# Patient Record
Sex: FEMALE | Race: WHITE | Employment: FULL TIME | URBAN - METROPOLITAN AREA
[De-identification: names, ages, dates, MRNs, and addresses within clinical notes are randomized per-mention and may not be internally consistent; named-entity substitution may affect disease eponyms.]

---

## 2018-04-09 ENCOUNTER — HOSPITAL ENCOUNTER (OUTPATIENT)
Dept: CT IMAGING | Age: 58
Discharge: HOME | End: 2018-04-11
Payer: COMMERCIAL

## 2018-04-09 ENCOUNTER — HOSPITAL ENCOUNTER (OUTPATIENT)
Age: 58
Discharge: HOME | End: 2018-04-11
Payer: COMMERCIAL

## 2018-04-09 ENCOUNTER — OFFICE VISIT (OUTPATIENT)
Dept: CARDIOLOGY CLINIC | Age: 58
End: 2018-04-09
Payer: COMMERCIAL

## 2018-04-09 ENCOUNTER — HOSPITAL ENCOUNTER (OUTPATIENT)
Age: 58
Discharge: HOME | End: 2018-04-09
Payer: COMMERCIAL

## 2018-04-09 VITALS
HEART RATE: 103 BPM | BODY MASS INDEX: 30.05 KG/M2 | DIASTOLIC BLOOD PRESSURE: 80 MMHG | RESPIRATION RATE: 16 BRPM | SYSTOLIC BLOOD PRESSURE: 126 MMHG | HEIGHT: 68 IN | WEIGHT: 198.3 LBS

## 2018-04-09 DIAGNOSIS — R06.02 SOB (SHORTNESS OF BREATH): Primary | ICD-10-CM

## 2018-04-09 DIAGNOSIS — R00.0 TACHYCARDIA: ICD-10-CM

## 2018-04-09 DIAGNOSIS — I26.99 OTHER PULMONARY EMBOLISM WITHOUT ACUTE COR PULMONALE, UNSPECIFIED CHRONICITY (HCC): ICD-10-CM

## 2018-04-09 DIAGNOSIS — R06.02 SOB (SHORTNESS OF BREATH): ICD-10-CM

## 2018-04-09 LAB
ANION GAP SERPL CALCULATED.3IONS-SCNC: 12 MMOL/L (ref 7–16)
BUN BLDV-MCNC: 11 MG/DL (ref 6–20)
CALCIUM SERPL-MCNC: 10.5 MG/DL (ref 8.6–10.2)
CHLORIDE BLD-SCNC: 101 MMOL/L (ref 98–107)
CO2: 27 MMOL/L (ref 22–29)
CREAT SERPL-MCNC: 1.1 MG/DL (ref 0.5–1)
D DIMER: 801 NG/ML DDU
GFR AFRICAN AMERICAN: >60
GFR NON-AFRICAN AMERICAN: 51 ML/MIN/1.73
GLUCOSE BLD-MCNC: 90 MG/DL (ref 74–109)
MAGNESIUM: 2.1 MG/DL (ref 1.6–2.6)
POTASSIUM SERPL-SCNC: 4.4 MMOL/L (ref 3.5–5)
SODIUM BLD-SCNC: 140 MMOL/L (ref 132–146)
TSH SERPL DL<=0.05 MIU/L-ACNC: 1.65 UIU/ML (ref 0.27–4.2)

## 2018-04-09 PROCEDURE — G8417 CALC BMI ABV UP PARAM F/U: HCPCS | Performed by: INTERNAL MEDICINE

## 2018-04-09 PROCEDURE — 3014F SCREEN MAMMO DOC REV: CPT | Performed by: INTERNAL MEDICINE

## 2018-04-09 PROCEDURE — 85378 FIBRIN DEGRADE SEMIQUANT: CPT

## 2018-04-09 PROCEDURE — G8427 DOCREV CUR MEDS BY ELIG CLIN: HCPCS | Performed by: INTERNAL MEDICINE

## 2018-04-09 PROCEDURE — 36415 COLL VENOUS BLD VENIPUNCTURE: CPT

## 2018-04-09 PROCEDURE — 80048 BASIC METABOLIC PNL TOTAL CA: CPT

## 2018-04-09 PROCEDURE — 83735 ASSAY OF MAGNESIUM: CPT

## 2018-04-09 PROCEDURE — 71275 CT ANGIOGRAPHY CHEST: CPT

## 2018-04-09 PROCEDURE — 93000 ELECTROCARDIOGRAM COMPLETE: CPT | Performed by: INTERNAL MEDICINE

## 2018-04-09 PROCEDURE — 3017F COLORECTAL CA SCREEN DOC REV: CPT | Performed by: INTERNAL MEDICINE

## 2018-04-09 PROCEDURE — 99244 OFF/OP CNSLTJ NEW/EST MOD 40: CPT | Performed by: INTERNAL MEDICINE

## 2018-04-09 PROCEDURE — 6360000004 HC RX CONTRAST MEDICATION: Performed by: RADIOLOGY

## 2018-04-09 PROCEDURE — 84443 ASSAY THYROID STIM HORMONE: CPT

## 2018-04-09 RX ORDER — MELATONIN 3 MG
5 LOZENGE ORAL NIGHTLY PRN
COMMUNITY
End: 2019-06-24

## 2018-04-09 RX ORDER — MAGNESIUM OXIDE 400 MG/1
400 TABLET ORAL DAILY
COMMUNITY
End: 2019-06-24

## 2018-04-09 RX ORDER — PANTOPRAZOLE SODIUM 40 MG/1
40 GRANULE, DELAYED RELEASE ORAL
COMMUNITY
End: 2019-06-24

## 2018-04-09 RX ADMIN — IOPAMIDOL 90 ML: 755 INJECTION, SOLUTION INTRAVENOUS at 21:24

## 2018-04-10 ENCOUNTER — TELEPHONE (OUTPATIENT)
Dept: CARDIOLOGY CLINIC | Age: 58
End: 2018-04-10

## 2018-04-12 ENCOUNTER — HOSPITAL ENCOUNTER (OUTPATIENT)
Dept: CARDIOLOGY | Age: 58
Discharge: HOME | End: 2018-04-12
Payer: COMMERCIAL

## 2018-04-12 DIAGNOSIS — R06.02 SOB (SHORTNESS OF BREATH): ICD-10-CM

## 2018-04-12 DIAGNOSIS — R00.0 TACHYCARDIA: ICD-10-CM

## 2018-04-12 LAB
LV EF: 70 %
LVEF MODALITY: NORMAL

## 2018-04-12 PROCEDURE — 93306 TTE W/DOPPLER COMPLETE: CPT

## 2018-04-13 ENCOUNTER — TELEPHONE (OUTPATIENT)
Dept: CARDIOLOGY CLINIC | Age: 58
End: 2018-04-13

## 2018-04-13 DIAGNOSIS — R06.02 SOB (SHORTNESS OF BREATH): Primary | ICD-10-CM

## 2018-04-13 DIAGNOSIS — R00.0 TACHYCARDIA: ICD-10-CM

## 2018-04-13 DIAGNOSIS — R79.89 ELEVATED D-DIMER: ICD-10-CM

## 2018-04-16 ENCOUNTER — TELEPHONE (OUTPATIENT)
Dept: CARDIOLOGY CLINIC | Age: 58
End: 2018-04-16

## 2018-04-16 ENCOUNTER — HOSPITAL ENCOUNTER (OUTPATIENT)
Dept: NUCLEAR MEDICINE | Age: 58
Discharge: HOME | End: 2018-04-16
Payer: COMMERCIAL

## 2018-04-16 ENCOUNTER — HOSPITAL ENCOUNTER (OUTPATIENT)
Dept: GENERAL RADIOLOGY | Age: 58
Discharge: HOME | End: 2018-04-18
Payer: COMMERCIAL

## 2018-04-16 DIAGNOSIS — R79.89 ELEVATED D-DIMER: ICD-10-CM

## 2018-04-16 DIAGNOSIS — R00.0 TACHYCARDIA: ICD-10-CM

## 2018-04-16 DIAGNOSIS — R06.02 SOB (SHORTNESS OF BREATH): ICD-10-CM

## 2018-04-16 PROCEDURE — 71046 X-RAY EXAM CHEST 2 VIEWS: CPT

## 2018-04-16 PROCEDURE — 3430000000 HC RX DIAGNOSTIC RADIOPHARMACEUTICAL: Performed by: RADIOLOGY

## 2018-04-16 PROCEDURE — A9540 TC99M MAA: HCPCS | Performed by: RADIOLOGY

## 2018-04-16 PROCEDURE — 78582 LUNG VENTILAT&PERFUS IMAGING: CPT

## 2018-04-16 PROCEDURE — A9558 XE133 XENON 10MCI: HCPCS | Performed by: RADIOLOGY

## 2018-04-16 RX ADMIN — Medication 15 MILLICURIE: at 13:26

## 2018-04-16 RX ADMIN — Medication 6 MILLICURIE: at 13:26

## 2018-04-17 ENCOUNTER — HOSPITAL ENCOUNTER (OUTPATIENT)
Dept: MRI IMAGING | Age: 58
Discharge: HOME | End: 2018-04-19
Payer: COMMERCIAL

## 2018-04-17 ENCOUNTER — TELEPHONE (OUTPATIENT)
Dept: CARDIOLOGY CLINIC | Age: 58
End: 2018-04-17

## 2018-04-17 DIAGNOSIS — R06.02 SOB (SHORTNESS OF BREATH): ICD-10-CM

## 2018-04-17 DIAGNOSIS — R00.0 TACHYCARDIA: ICD-10-CM

## 2018-04-17 DIAGNOSIS — R06.02 SOB (SHORTNESS OF BREATH): Primary | ICD-10-CM

## 2018-04-17 DIAGNOSIS — R79.89 ELEVATED D-DIMER: ICD-10-CM

## 2018-04-18 ENCOUNTER — HOSPITAL ENCOUNTER (OUTPATIENT)
Age: 58
Discharge: HOME | End: 2018-04-18
Payer: COMMERCIAL

## 2018-04-18 DIAGNOSIS — R06.02 SOB (SHORTNESS OF BREATH): ICD-10-CM

## 2018-04-18 LAB — SEDIMENTATION RATE, ERYTHROCYTE: 2 MM/HR (ref 0–20)

## 2018-04-18 PROCEDURE — 85651 RBC SED RATE NONAUTOMATED: CPT

## 2018-04-18 PROCEDURE — 36415 COLL VENOUS BLD VENIPUNCTURE: CPT

## 2018-04-23 ENCOUNTER — TELEPHONE (OUTPATIENT)
Dept: CARDIOLOGY CLINIC | Age: 58
End: 2018-04-23

## 2018-04-24 ENCOUNTER — HOSPITAL ENCOUNTER (OUTPATIENT)
Dept: CARDIOLOGY | Age: 58
Discharge: HOME | End: 2018-04-24
Payer: COMMERCIAL

## 2018-04-24 VITALS
SYSTOLIC BLOOD PRESSURE: 126 MMHG | OXYGEN SATURATION: 97 % | BODY MASS INDEX: 28.79 KG/M2 | HEART RATE: 101 BPM | WEIGHT: 190 LBS | HEIGHT: 68 IN | DIASTOLIC BLOOD PRESSURE: 78 MMHG

## 2018-04-24 DIAGNOSIS — R06.02 SOB (SHORTNESS OF BREATH): ICD-10-CM

## 2018-04-24 DIAGNOSIS — R06.02 SHORTNESS OF BREATH: Primary | ICD-10-CM

## 2018-04-24 PROCEDURE — 78452 HT MUSCLE IMAGE SPECT MULT: CPT

## 2018-04-24 PROCEDURE — 2580000003 HC RX 258: Performed by: INTERNAL MEDICINE

## 2018-04-24 PROCEDURE — 3430000000 HC RX DIAGNOSTIC RADIOPHARMACEUTICAL: Performed by: INTERNAL MEDICINE

## 2018-04-24 PROCEDURE — 93017 CV STRESS TEST TRACING ONLY: CPT

## 2018-04-24 PROCEDURE — A9500 TC99M SESTAMIBI: HCPCS | Performed by: INTERNAL MEDICINE

## 2018-04-24 RX ORDER — MINOCYCLINE HYDROCHLORIDE 100 MG/1
1 CAPSULE ORAL DAILY
Refills: 2 | COMMUNITY
Start: 2018-04-19 | End: 2019-02-12 | Stop reason: ALTCHOICE

## 2018-04-24 RX ORDER — SODIUM CHLORIDE 0.9 % (FLUSH) 0.9 %
10 SYRINGE (ML) INJECTION PRN
Status: DISCONTINUED | OUTPATIENT
Start: 2018-04-24 | End: 2018-04-25 | Stop reason: HOSPADM

## 2018-04-24 RX ADMIN — Medication 10 ML: at 09:46

## 2018-04-24 RX ADMIN — Medication 11 MILLICURIE: at 08:07

## 2018-04-24 RX ADMIN — Medication 29.9 MILLICURIE: at 09:46

## 2018-04-24 RX ADMIN — Medication 10 ML: at 08:07

## 2018-04-25 ENCOUNTER — TELEPHONE (OUTPATIENT)
Dept: CARDIOLOGY CLINIC | Age: 58
End: 2018-04-25

## 2018-04-25 RX ORDER — METOPROLOL SUCCINATE 25 MG/1
12.5 TABLET, EXTENDED RELEASE ORAL DAILY
Qty: 15 TABLET | Refills: 11 | Status: SHIPPED | OUTPATIENT
Start: 2018-04-25 | End: 2019-03-01

## 2019-01-17 ENCOUNTER — HOSPITAL ENCOUNTER (OUTPATIENT)
Age: 59
Discharge: HOME | End: 2019-01-17
Payer: COMMERCIAL

## 2019-01-17 PROCEDURE — 88304 TISSUE EXAM BY PATHOLOGIST: CPT

## 2019-03-01 ENCOUNTER — APPOINTMENT (OUTPATIENT)
Dept: CT IMAGING | Age: 59
End: 2019-03-01
Payer: COMMERCIAL

## 2019-03-01 ENCOUNTER — HOSPITAL ENCOUNTER (EMERGENCY)
Age: 59
Discharge: HOME | End: 2019-03-01
Payer: COMMERCIAL

## 2019-03-01 VITALS
SYSTOLIC BLOOD PRESSURE: 151 MMHG | RESPIRATION RATE: 16 BRPM | OXYGEN SATURATION: 97 % | TEMPERATURE: 98.3 F | DIASTOLIC BLOOD PRESSURE: 89 MMHG | HEART RATE: 104 BPM

## 2019-03-01 DIAGNOSIS — R51.9 ACUTE NONINTRACTABLE HEADACHE, UNSPECIFIED HEADACHE TYPE: Primary | ICD-10-CM

## 2019-03-01 PROCEDURE — 72125 CT NECK SPINE W/O DYE: CPT

## 2019-03-01 PROCEDURE — 70450 CT HEAD/BRAIN W/O DYE: CPT

## 2019-03-01 PROCEDURE — 96374 THER/PROPH/DIAG INJ IV PUSH: CPT

## 2019-03-01 PROCEDURE — 6360000002 HC RX W HCPCS: Performed by: PHYSICIAN ASSISTANT

## 2019-03-01 PROCEDURE — 2580000003 HC RX 258: Performed by: PHYSICIAN ASSISTANT

## 2019-03-01 PROCEDURE — 96375 TX/PRO/DX INJ NEW DRUG ADDON: CPT

## 2019-03-01 PROCEDURE — 99284 EMERGENCY DEPT VISIT MOD MDM: CPT

## 2019-03-01 RX ORDER — KETOROLAC TROMETHAMINE 10 MG/1
10 TABLET, FILM COATED ORAL EVERY 8 HOURS PRN
Qty: 12 TABLET | Refills: 0 | Status: SHIPPED | OUTPATIENT
Start: 2019-03-01 | End: 2019-06-24

## 2019-03-01 RX ORDER — KETOROLAC TROMETHAMINE 30 MG/ML
30 INJECTION, SOLUTION INTRAMUSCULAR; INTRAVENOUS ONCE
Status: COMPLETED | OUTPATIENT
Start: 2019-03-01 | End: 2019-03-01

## 2019-03-01 RX ORDER — ONDANSETRON 2 MG/ML
4 INJECTION INTRAMUSCULAR; INTRAVENOUS ONCE
Status: COMPLETED | OUTPATIENT
Start: 2019-03-01 | End: 2019-03-01

## 2019-03-01 RX ORDER — FENTANYL CITRATE 50 UG/ML
25 INJECTION, SOLUTION INTRAMUSCULAR; INTRAVENOUS ONCE
Status: COMPLETED | OUTPATIENT
Start: 2019-03-01 | End: 2019-03-01

## 2019-03-01 RX ORDER — DIPHENHYDRAMINE HYDROCHLORIDE 50 MG/ML
25 INJECTION INTRAMUSCULAR; INTRAVENOUS ONCE
Status: COMPLETED | OUTPATIENT
Start: 2019-03-01 | End: 2019-03-01

## 2019-03-01 RX ORDER — 0.9 % SODIUM CHLORIDE 0.9 %
1000 INTRAVENOUS SOLUTION INTRAVENOUS ONCE
Status: COMPLETED | OUTPATIENT
Start: 2019-03-01 | End: 2019-03-01

## 2019-03-01 RX ADMIN — DIPHENHYDRAMINE HYDROCHLORIDE 25 MG: 50 INJECTION, SOLUTION INTRAMUSCULAR; INTRAVENOUS at 14:06

## 2019-03-01 RX ADMIN — SODIUM CHLORIDE 1000 ML: 9 INJECTION, SOLUTION INTRAVENOUS at 14:52

## 2019-03-01 RX ADMIN — KETOROLAC TROMETHAMINE 30 MG: 30 INJECTION, SOLUTION INTRAMUSCULAR at 15:16

## 2019-03-01 RX ADMIN — ONDANSETRON HYDROCHLORIDE 4 MG: 2 SOLUTION INTRAMUSCULAR; INTRAVENOUS at 14:05

## 2019-03-01 RX ADMIN — FENTANYL CITRATE 25 MCG: 50 INJECTION INTRAMUSCULAR; INTRAVENOUS at 14:08

## 2019-03-01 ASSESSMENT — PAIN DESCRIPTION - LOCATION
LOCATION: NECK;HEAD
LOCATION: NECK;HEAD
LOCATION: HEAD
LOCATION: NECK

## 2019-03-01 ASSESSMENT — PAIN DESCRIPTION - PAIN TYPE
TYPE: ACUTE PAIN

## 2019-03-01 ASSESSMENT — PAIN SCALES - GENERAL
PAINLEVEL_OUTOF10: 10
PAINLEVEL_OUTOF10: 10
PAINLEVEL_OUTOF10: 8
PAINLEVEL_OUTOF10: 2
PAINLEVEL_OUTOF10: 8

## 2019-03-01 ASSESSMENT — PAIN DESCRIPTION - ORIENTATION: ORIENTATION: POSTERIOR

## 2019-06-24 ENCOUNTER — HOSPITAL ENCOUNTER (OUTPATIENT)
Age: 59
Discharge: HOME | End: 2019-06-26
Payer: COMMERCIAL

## 2019-06-24 ENCOUNTER — OFFICE VISIT (OUTPATIENT)
Dept: PRIMARY CARE CLINIC | Age: 59
End: 2019-06-24
Payer: COMMERCIAL

## 2019-06-24 VITALS
BODY MASS INDEX: 30.28 KG/M2 | WEIGHT: 199.8 LBS | HEIGHT: 68 IN | DIASTOLIC BLOOD PRESSURE: 82 MMHG | SYSTOLIC BLOOD PRESSURE: 129 MMHG | TEMPERATURE: 98.2 F

## 2019-06-24 DIAGNOSIS — F41.9 ANXIETY: ICD-10-CM

## 2019-06-24 DIAGNOSIS — E11.9 DIET-CONTROLLED DIABETES MELLITUS (HCC): ICD-10-CM

## 2019-06-24 DIAGNOSIS — E78.2 MIXED HYPERLIPIDEMIA: ICD-10-CM

## 2019-06-24 DIAGNOSIS — J01.80 ACUTE NON-RECURRENT SINUSITIS OF OTHER SINUS: ICD-10-CM

## 2019-06-24 DIAGNOSIS — Z00.01 ENCOUNTER FOR ANNUAL GENERAL MEDICAL EXAMINATION WITH ABNORMAL FINDINGS IN ADULT: Primary | ICD-10-CM

## 2019-06-24 DIAGNOSIS — M79.10 MYALGIA: ICD-10-CM

## 2019-06-24 DIAGNOSIS — K21.9 GASTROESOPHAGEAL REFLUX DISEASE WITHOUT ESOPHAGITIS: ICD-10-CM

## 2019-06-24 DIAGNOSIS — Z00.01 ENCOUNTER FOR ANNUAL GENERAL MEDICAL EXAMINATION WITH ABNORMAL FINDINGS IN ADULT: ICD-10-CM

## 2019-06-24 LAB
ALBUMIN SERPL-MCNC: 4.7 G/DL (ref 3.5–5.2)
ALP BLD-CCNC: 77 U/L (ref 35–104)
ALT SERPL-CCNC: 41 U/L (ref 0–32)
ANION GAP SERPL CALCULATED.3IONS-SCNC: 18 MMOL/L (ref 7–16)
AST SERPL-CCNC: 28 U/L (ref 0–31)
BASOPHILS ABSOLUTE: 0.02 E9/L (ref 0–0.2)
BASOPHILS RELATIVE PERCENT: 0.3 % (ref 0–2)
BILIRUB SERPL-MCNC: 1 MG/DL (ref 0–1.2)
BUN BLDV-MCNC: 15 MG/DL (ref 6–20)
BURR CELLS: ABNORMAL
CALCIUM SERPL-MCNC: 10.2 MG/DL (ref 8.6–10.2)
CHLORIDE BLD-SCNC: 100 MMOL/L (ref 98–107)
CHOLESTEROL, TOTAL: 204 MG/DL (ref 0–199)
CO2: 22 MMOL/L (ref 22–29)
CREAT SERPL-MCNC: 0.9 MG/DL (ref 0.5–1)
EOSINOPHILS ABSOLUTE: 0.08 E9/L (ref 0.05–0.5)
EOSINOPHILS RELATIVE PERCENT: 1.1 % (ref 0–6)
GFR AFRICAN AMERICAN: >60
GFR NON-AFRICAN AMERICAN: >60 ML/MIN/1.73
GLUCOSE BLD-MCNC: 104 MG/DL (ref 74–99)
HBA1C MFR BLD: 4.7 % (ref 4–5.6)
HCT VFR BLD CALC: 48 % (ref 34–48)
HDLC SERPL-MCNC: 69 MG/DL
HEMOGLOBIN: 15.8 G/DL (ref 11.5–15.5)
IMMATURE GRANULOCYTES #: 0.03 E9/L
IMMATURE GRANULOCYTES %: 0.4 % (ref 0–5)
LDL CHOLESTEROL CALCULATED: 115 MG/DL (ref 0–99)
LYMPHOCYTES ABSOLUTE: 0.5 E9/L (ref 1.5–4)
LYMPHOCYTES RELATIVE PERCENT: 6.9 % (ref 20–42)
MCH RBC QN AUTO: 30.8 PG (ref 26–35)
MCHC RBC AUTO-ENTMCNC: 32.9 % (ref 32–34.5)
MCV RBC AUTO: 93.6 FL (ref 80–99.9)
MONOCYTES ABSOLUTE: 0.61 E9/L (ref 0.1–0.95)
MONOCYTES RELATIVE PERCENT: 8.4 % (ref 2–12)
NEUTROPHILS ABSOLUTE: 6.03 E9/L (ref 1.8–7.3)
NEUTROPHILS RELATIVE PERCENT: 82.9 % (ref 43–80)
OVALOCYTES: ABNORMAL
PDW BLD-RTO: 13.9 FL (ref 11.5–15)
PLATELET # BLD: 188 E9/L (ref 130–450)
PMV BLD AUTO: 9.4 FL (ref 7–12)
POIKILOCYTES: ABNORMAL
POLYCHROMASIA: ABNORMAL
POTASSIUM SERPL-SCNC: 4.7 MMOL/L (ref 3.5–5)
RBC # BLD: 5.13 E12/L (ref 3.5–5.5)
SODIUM BLD-SCNC: 140 MMOL/L (ref 132–146)
TOTAL PROTEIN: 8 G/DL (ref 6.4–8.3)
TRIGL SERPL-MCNC: 98 MG/DL (ref 0–149)
VLDLC SERPL CALC-MCNC: 20 MG/DL
WBC # BLD: 7.3 E9/L (ref 4.5–11.5)

## 2019-06-24 PROCEDURE — 80053 COMPREHEN METABOLIC PANEL: CPT

## 2019-06-24 PROCEDURE — 80061 LIPID PANEL: CPT

## 2019-06-24 PROCEDURE — 83036 HEMOGLOBIN GLYCOSYLATED A1C: CPT

## 2019-06-24 PROCEDURE — 36415 COLL VENOUS BLD VENIPUNCTURE: CPT

## 2019-06-24 PROCEDURE — 99396 PREV VISIT EST AGE 40-64: CPT | Performed by: FAMILY MEDICINE

## 2019-06-24 PROCEDURE — 85025 COMPLETE CBC W/AUTO DIFF WBC: CPT

## 2019-06-24 RX ORDER — BUPROPION HYDROCHLORIDE 300 MG/1
300 TABLET ORAL EVERY MORNING
Qty: 90 TABLET | Refills: 5 | Status: SHIPPED
Start: 2019-06-24 | End: 2020-07-29 | Stop reason: SDUPTHER

## 2019-06-24 RX ORDER — PANTOPRAZOLE SODIUM 40 MG/1
40 TABLET, DELAYED RELEASE ORAL DAILY
Qty: 90 TABLET | Refills: 5 | Status: SHIPPED
Start: 2019-06-24 | End: 2020-07-29 | Stop reason: SDUPTHER

## 2019-06-24 RX ORDER — CEFDINIR 300 MG/1
300 CAPSULE ORAL 2 TIMES DAILY
Qty: 20 CAPSULE | Refills: 0 | Status: SHIPPED | OUTPATIENT
Start: 2019-06-24 | End: 2019-07-04

## 2019-06-24 RX ORDER — CYCLOBENZAPRINE HCL 10 MG
10 TABLET ORAL 2 TIMES DAILY PRN
Qty: 90 TABLET | Refills: 5 | Status: SHIPPED
Start: 2019-06-24 | End: 2020-07-29 | Stop reason: SDUPTHER

## 2019-06-24 ASSESSMENT — ENCOUNTER SYMPTOMS
ALLERGIC/IMMUNOLOGIC NEGATIVE: 1
GASTROINTESTINAL NEGATIVE: 1
RESPIRATORY NEGATIVE: 1
EYES NEGATIVE: 1

## 2019-06-24 NOTE — PROGRESS NOTES
19  Name: Laquita Denis    : 1960    Sex: female    Age: 63 y.o. Subjective:  Chief Complaint: Patient is here for physical and sinus couigh      Three days with cough and sinus     Also    Also  faield to get lab done      Also   Er  March with burt and stress with bro and  bp up and  fien sicne  Ct   ok      Review of Systems   Constitutional: Negative. HENT: Negative. Eyes: Negative. Respiratory: Negative. Cardiovascular: Negative. Gastrointestinal: Negative. Endocrine: Negative. Genitourinary: Negative. Musculoskeletal: Negative. Skin: Negative. Allergic/Immunologic: Negative. Neurological: Negative. Hematological: Negative. Psychiatric/Behavioral: Negative.           Current Outpatient Medications:     buPROPion (WELLBUTRIN XL) 300 MG extended release tablet, Take 1 tablet by mouth every morning, Disp: 90 tablet, Rfl: 5    cyclobenzaprine (FLEXERIL) 10 MG tablet, Take 1 tablet by mouth 2 times daily as needed for Muscle spasms, Disp: 90 tablet, Rfl: 5    pantoprazole (PROTONIX) 40 MG tablet, Take 1 tablet by mouth daily, Disp: 90 tablet, Rfl: 5    cefdinir (OMNICEF) 300 MG capsule, Take 1 capsule by mouth 2 times daily for 10 days, Disp: 20 capsule, Rfl: 0    ELIDEL 1 % cream, , Disp: , Rfl:     thyroid (ARMOUR THYROID) 120 MG tablet, Take 120 mg by mouth daily No longer takes, Disp: , Rfl:     omeprazole (PRILOSEC) 40 MG delayed release capsule, No longer taking, Disp: , Rfl:   No Known Allergies  Social History     Socioeconomic History    Marital status:      Spouse name: Not on file    Number of children: Not on file    Years of education: Not on file    Highest education level: Not on file   Occupational History    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   Social Needs    Financial resource strain: Not on file    Food insecurity:     Worry: Not on file     Inability: Not on file    Transportation needs:     Medical: Not on file     Non-medical: Not on file   Tobacco Use    Smoking status: Former Smoker     Types: Cigarettes     Last attempt to quit: 2012     Years since quittin.4    Smokeless tobacco: Never Used    Tobacco comment: 2012   Substance and Sexual Activity    Alcohol use: Yes     Comment: social    Drug use: No    Sexual activity: Yes     Partners: Male   Lifestyle    Physical activity:     Days per week: Not on file     Minutes per session: Not on file    Stress: Not on file   Relationships    Social connections:     Talks on phone: Not on file     Gets together: Not on file     Attends Temple service: Not on file     Active member of club or organization: Not on file     Attends meetings of clubs or organizations: Not on file     Relationship status: Not on file    Intimate partner violence:     Fear of current or ex partner: Not on file     Emotionally abused: Not on file     Physically abused: Not on file     Forced sexual activity: Not on file   Other Topics Concern    Not on file   Social History Narrative        HEMATOCHROMATOSIS    SLIGHT HYPOGLYDEMIA    PMS    LOW PHOSPOHOROUS IN PAST    LIPID    LOW [facility]    SMOKER--QUIT 12    ABLATION UTERUS    BTS    L OVARY REMOVED DUE TO CYST    LOW BACK PAIN---DISC---YARAB WITH SHOTS AND SAW [facility]    Luis Dietz  1960 Page #2    P- HYSTER 10-10    MGR [employer]----PROMOTED TO SENIOR  4-18    MAIDEN NAME ISMA    GRANDMOTHER ON MOTHERS SIDE WITH BREAST CA    ER VISIT  WITH ELEV LFT AND GB SLUDGE    ELEV LFT 4-12    TMT     ELEV BP 4-12    LEFT FOOT OR 11-12    SLEEP APNEA---REFUSES TX    GERD     ELEV LFT -13    GL SLUDGE -----STONES ON US ----GB OUT  [address] MASTERSON WITH MILED GERD     COLONOSCOPY AND EGD  YURICH----7 TO 10 YRS--- MELANOXIX COLI---POSS FROM    OLD HEMORRHOID OR PER PT    EGD   [address] [facility] AND ARMOUR THRYOID     GRANDMOHTER ON MOTHERS SIDE WITH BREAST CA    EVAL WITH COUNSELOR 10-16 FOR OVER EATING    EVAL DR ELLER  ELEV D DIMER AND CTA NEG------ ECHO DONE---- VQ NEG----    UGI  WITH MINIMAL GERD    ELEV CREA 1.1 ON  AND ON 4-10 AFTER CT CHEST WAS 1,14    NEG TMT AND ECHO  DR SPANN----OFFERED BETA BLOCKER AND PT REF    RIGHT BUNION FOOT OR DR Linda Harden     LOST JOB 18    LEFT FIRST TOE OR DR Linda Harden     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 LINE [employer]     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 [place]      Er with burt with stress    3-19       Ct  Head and neck ok      Past Medical History:   Diagnosis Date    Abnormal Pap smear of cervix     Gallstones     Menopausal symptoms     Osteopenia     SOB (shortness of breath)     In am and Pm resting    Weight gain      Family History   Problem Relation Age of Onset    Other Father          [id number] Aortic aneurysm 10 cm AAA repair    No Known Problems Sister     No Known Problems Brother     No Known Problems Brother       Past Surgical History:   Procedure Laterality Date    FOOT NEUROMA SURGERY Bilateral     DR Talamantes Dire    HYSTERECTOMY      DR Carmen Mack    ROTATOR CUFF REPAIR  ,     RIGHT      Vitals:    19 0812   BP: 129/82   Temp: 98.2 °F (36.8 °C)   Weight: 199 lb 12.8 oz (90.6 kg)   Height: 5' 8\" (1.727 m)       Objective:    Physical Exam   Constitutional: She is oriented to person, place, and time. She appears well-developed and well-nourished. HENT:   Head: Normocephalic. Eyes: Pupils are equal, round, and reactive to light. EOM are normal.   Neck: Normal range of motion. Cardiovascular: Normal rate and regular rhythm. Pulmonary/Chest: Effort normal and breath sounds normal.   Abdominal: Soft. Musculoskeletal: Normal range of motion. Neurological: She is alert and oriented to person, place, and time. Skin: Skin is warm.    Psychiatric: She has a normal mood and affect. Her behavior is normal.   Vitals reviewed. Sarah Renteria was seen today for annual exam.    Diagnoses and all orders for this visit:    Encounter for annual general medical examination with abnormal findings in adult    Acute non-recurrent sinusitis of other sinus  -     cefdinir (OMNICEF) 300 MG capsule; Take 1 capsule by mouth 2 times daily for 10 days    Anxiety  -     buPROPion (WELLBUTRIN XL) 300 MG extended release tablet; Take 1 tablet by mouth every morning    Gastroesophageal reflux disease without esophagitis  -     pantoprazole (PROTONIX) 40 MG tablet; Take 1 tablet by mouth daily    Myalgia  -     cyclobenzaprine (FLEXERIL) 10 MG tablet; Take 1 tablet by mouth 2 times daily as needed for Muscle spasms        Comments: med  Lab  Pt to call here after   Diet exer   Hm  issseus  A great deal of time spent reviewing medications, diet, exercise, social issues. Also reviewing the chart before entering the room with patient and finishing charting after leaving patient's room. More than half of that time was spent face to face with the patient in counseling and coordinating care. Follow Up: Return for to call.      Seen by:  Elsa Becerra DO

## 2019-11-08 ENCOUNTER — OFFICE VISIT (OUTPATIENT)
Dept: FAMILY MEDICINE CLINIC | Age: 59
End: 2019-11-08
Payer: COMMERCIAL

## 2019-11-08 VITALS
BODY MASS INDEX: 29.1 KG/M2 | WEIGHT: 192 LBS | HEART RATE: 108 BPM | HEIGHT: 68 IN | DIASTOLIC BLOOD PRESSURE: 78 MMHG | OXYGEN SATURATION: 99 % | TEMPERATURE: 97.9 F | SYSTOLIC BLOOD PRESSURE: 132 MMHG | RESPIRATION RATE: 20 BRPM

## 2019-11-08 DIAGNOSIS — R09.82 POSTNASAL DRIP: ICD-10-CM

## 2019-11-08 DIAGNOSIS — R07.0 PAIN IN THROAT: ICD-10-CM

## 2019-11-08 DIAGNOSIS — J01.90 ACUTE NON-RECURRENT SINUSITIS, UNSPECIFIED LOCATION: Primary | ICD-10-CM

## 2019-11-08 DIAGNOSIS — R51.9 SINUS HEADACHE: ICD-10-CM

## 2019-11-08 PROCEDURE — 1036F TOBACCO NON-USER: CPT | Performed by: PHYSICIAN ASSISTANT

## 2019-11-08 PROCEDURE — 99214 OFFICE O/P EST MOD 30 MIN: CPT | Performed by: PHYSICIAN ASSISTANT

## 2019-11-08 PROCEDURE — 3017F COLORECTAL CA SCREEN DOC REV: CPT | Performed by: PHYSICIAN ASSISTANT

## 2019-11-08 PROCEDURE — G8427 DOCREV CUR MEDS BY ELIG CLIN: HCPCS | Performed by: PHYSICIAN ASSISTANT

## 2019-11-08 PROCEDURE — G8417 CALC BMI ABV UP PARAM F/U: HCPCS | Performed by: PHYSICIAN ASSISTANT

## 2019-11-08 PROCEDURE — 96372 THER/PROPH/DIAG INJ SC/IM: CPT | Performed by: PHYSICIAN ASSISTANT

## 2019-11-08 PROCEDURE — G8484 FLU IMMUNIZE NO ADMIN: HCPCS | Performed by: PHYSICIAN ASSISTANT

## 2019-11-08 RX ORDER — CEFUROXIME AXETIL 250 MG/1
250 TABLET ORAL 2 TIMES DAILY
Qty: 20 TABLET | Refills: 0 | Status: SHIPPED | OUTPATIENT
Start: 2019-11-08 | End: 2019-11-18

## 2019-11-08 RX ORDER — METHYLPREDNISOLONE ACETATE 40 MG/ML
40 INJECTION, SUSPENSION INTRA-ARTICULAR; INTRALESIONAL; INTRAMUSCULAR; SOFT TISSUE ONCE
Status: COMPLETED | OUTPATIENT
Start: 2019-11-08 | End: 2019-11-08

## 2019-11-08 RX ORDER — CHLORPHENIRAMINE MALEATE 4 MG/1
4 TABLET ORAL EVERY 6 HOURS PRN
Qty: 20 TABLET | Refills: 0 | Status: SHIPPED
Start: 2019-11-08 | End: 2020-04-20 | Stop reason: ALTCHOICE

## 2019-11-08 RX ORDER — METHYLPREDNISOLONE 4 MG/1
TABLET ORAL
Qty: 1 KIT | Refills: 0 | Status: SHIPPED
Start: 2019-11-08 | End: 2020-04-20 | Stop reason: ALTCHOICE

## 2019-11-08 RX ADMIN — METHYLPREDNISOLONE ACETATE 40 MG: 40 INJECTION, SUSPENSION INTRA-ARTICULAR; INTRALESIONAL; INTRAMUSCULAR; SOFT TISSUE at 14:26

## 2019-12-02 RX ORDER — ALPRAZOLAM 0.5 MG/1
0.5 TABLET ORAL NIGHTLY PRN
COMMUNITY
End: 2020-07-29

## 2019-12-02 RX ORDER — ZOLPIDEM TARTRATE 10 MG/1
TABLET ORAL NIGHTLY PRN
COMMUNITY
End: 2020-07-29

## 2020-04-20 ENCOUNTER — TELEPHONE (OUTPATIENT)
Dept: PRIMARY CARE CLINIC | Age: 60
End: 2020-04-20

## 2020-04-20 ENCOUNTER — HOSPITAL ENCOUNTER (OUTPATIENT)
Age: 60
Discharge: HOME | End: 2020-04-22
Payer: COMMERCIAL

## 2020-04-20 ENCOUNTER — OFFICE VISIT (OUTPATIENT)
Dept: PRIMARY CARE CLINIC | Age: 60
End: 2020-04-20
Payer: COMMERCIAL

## 2020-04-20 VITALS
SYSTOLIC BLOOD PRESSURE: 130 MMHG | TEMPERATURE: 97.7 F | DIASTOLIC BLOOD PRESSURE: 78 MMHG | OXYGEN SATURATION: 98 % | HEIGHT: 68 IN | WEIGHT: 190 LBS | BODY MASS INDEX: 28.79 KG/M2 | RESPIRATION RATE: 18 BRPM | HEART RATE: 99 BPM

## 2020-04-20 PROCEDURE — 1036F TOBACCO NON-USER: CPT | Performed by: PHYSICIAN ASSISTANT

## 2020-04-20 PROCEDURE — 99214 OFFICE O/P EST MOD 30 MIN: CPT | Performed by: PHYSICIAN ASSISTANT

## 2020-04-20 PROCEDURE — U0002 COVID-19 LAB TEST NON-CDC: HCPCS

## 2020-04-20 PROCEDURE — G8427 DOCREV CUR MEDS BY ELIG CLIN: HCPCS | Performed by: PHYSICIAN ASSISTANT

## 2020-04-20 PROCEDURE — 3017F COLORECTAL CA SCREEN DOC REV: CPT | Performed by: PHYSICIAN ASSISTANT

## 2020-04-20 PROCEDURE — G8417 CALC BMI ABV UP PARAM F/U: HCPCS | Performed by: PHYSICIAN ASSISTANT

## 2020-04-20 RX ORDER — AZITHROMYCIN 250 MG/1
250 TABLET, FILM COATED ORAL SEE ADMIN INSTRUCTIONS
Qty: 6 TABLET | Refills: 0 | Status: SHIPPED | OUTPATIENT
Start: 2020-04-20 | End: 2020-04-25

## 2020-04-20 NOTE — PROGRESS NOTES
Joan Valentino  1960    Chief Complaint   Patient presents with    Pharyngitis     onset 4 days ago    Congestion     chest    Headache    Otalgia       Respiratory Symptoms:  Patient complains of a few day(s) history of myalgias/arthralgias, headache, ear pain: on the left, sore throat and chest congestion. Symptoms have been improving with time. She denies any other symptoms . The patient has not had fevers. The patient is only had a slight cough. The patient does work with the public. The patient is not having any dysuria, hematuria. The patient is eating and drinking normally. Relevant PMH: No pertinent PMH. Smoking history:  She  reports that she quit smoking about 8 years ago. Her smoking use included cigarettes. She has never used smokeless tobacco.     She has had no known ill contacts. Treatment to date: oral decongestant.     Travel screen completed:  Yes          Past Medical History:   Diagnosis Date    Abnormal Pap smear of cervix     Gallstones     Menopausal symptoms     Osteopenia     SOB (shortness of breath)     In am and Pm resting    Weight gain        Past Surgical History:   Procedure Laterality Date    FOOT NEUROMA SURGERY Bilateral 2012    DR Zoya Zhang    HYSTERECTOMY  2010    DR Mainor Harding ROTATOR CUFF REPAIR  2004, 2005    RIGHT       Family History   Problem Relation Age of Onset    Other Father          [id number] Aortic aneurysm 10 cm AAA repair    No Known Problems Sister     No Known Problems Brother     No Known Problems Brother          Current Outpatient Medications:     ALPRAZolam (XANAX) 0.5 MG tablet, Take 0.5 mg by mouth nightly as needed for Sleep., Disp: , Rfl:     zolpidem (AMBIEN) 10 MG tablet, Take by mouth nightly as needed for Sleep., Disp: , Rfl:     Estradiol-Estriol-Progesterone (BIEST/PROGESTERONE TD), Place onto the skin 0.5 ML qhs, Disp: , Rfl:     buPROPion (WELLBUTRIN XL) 300 MG extended release tablet, Take 1 tablet by

## 2020-04-22 ENCOUNTER — TELEPHONE (OUTPATIENT)
Dept: PRIMARY CARE CLINIC | Age: 60
End: 2020-04-22

## 2020-04-22 ENCOUNTER — CARE COORDINATION (OUTPATIENT)
Dept: CARE COORDINATION | Age: 60
End: 2020-04-22

## 2020-04-22 NOTE — TELEPHONE ENCOUNTER
[facility] Flu Clinic Follow Up Call  Flu Clinic Visit Date:  2020    Patient: Ziggy Dietz Patient : 1960     PCP:  Emmanuel Jrodan    Spoke with: Patient    Interactive Patient Contact:  Was patient able to fill all prescriptions: yes  Is patient taking all medications as directed on the After Visit Summary?  yes  Does patient understand their visit instructions: Yes  Does patient have questions or concerns that need addressed?: no  Follow-up testing completed?: yes    Current patient status:   Improved,but still sore throat and fatigue    Self-care instructions reviewed:  N/A    Face to face required?  yes,    Able to participate in virtual visit? no    Additional patient instructions: complete antibiotics/ if symptoms worsen can return to flu clinic/ will forward to [facility] for follow  1 week          Kiara Glover RN   20

## 2020-04-22 NOTE — CARE COORDINATION
RN/LPN placed call to patient to respond to Yellow alert due to Low energy in [facility] Loop. Patient reports energy level is the same and was just registering. RN/LPN instructed patient to continue to monitor her symptoms, report them on loop. Increase fluids and rest, take tylenol for body aches or fever unless contraindicated.      Due to no new or worsening symptoms PCP not notified

## 2020-04-23 LAB
SARS-COV-2: NOT DETECTED
SOURCE: NORMAL

## 2020-04-27 ENCOUNTER — TELEPHONE (OUTPATIENT)
Dept: PRIMARY CARE CLINIC | Age: 60
End: 2020-04-27

## 2020-07-10 ENCOUNTER — TELEPHONE (OUTPATIENT)
Dept: ADMINISTRATIVE | Age: 60
End: 2020-07-10

## 2020-07-10 DIAGNOSIS — M81.0 AGE-RELATED OSTEOPOROSIS WITHOUT CURRENT PATHOLOGICAL FRACTURE: ICD-10-CM

## 2020-07-10 DIAGNOSIS — E03.9 ACQUIRED HYPOTHYROIDISM: ICD-10-CM

## 2020-07-10 DIAGNOSIS — Z00.01 ENCOUNTER FOR ANNUAL GENERAL MEDICAL EXAMINATION WITH ABNORMAL FINDINGS IN ADULT: Primary | ICD-10-CM

## 2020-07-10 NOTE — TELEPHONE ENCOUNTER
Pt called in to schedule yearly physical, she would like to do labs before the appt date, can  put in lab orders and call her to advise her, [phone]

## 2020-07-25 ENCOUNTER — HOSPITAL ENCOUNTER (OUTPATIENT)
Age: 60
Discharge: HOME | End: 2020-07-25
Payer: COMMERCIAL

## 2020-07-25 LAB
ALBUMIN SERPL-MCNC: 4.4 G/DL (ref 3.5–5.2)
ALP BLD-CCNC: 61 U/L (ref 35–104)
ALT SERPL-CCNC: 30 U/L (ref 0–32)
ANION GAP SERPL CALCULATED.3IONS-SCNC: 10 MMOL/L (ref 7–16)
AST SERPL-CCNC: 21 U/L (ref 0–31)
BACTERIA: ABNORMAL /HPF
BASOPHILS ABSOLUTE: 0.03 E9/L (ref 0–0.2)
BASOPHILS RELATIVE PERCENT: 0.7 % (ref 0–2)
BILIRUB SERPL-MCNC: 0.5 MG/DL (ref 0–1.2)
BILIRUBIN URINE: NEGATIVE
BLOOD, URINE: NEGATIVE
BUN BLDV-MCNC: 10 MG/DL (ref 6–20)
CALCIUM SERPL-MCNC: 9.4 MG/DL (ref 8.6–10.2)
CHLORIDE BLD-SCNC: 106 MMOL/L (ref 98–107)
CHOLESTEROL, TOTAL: 212 MG/DL (ref 0–199)
CLARITY: CLEAR
CO2: 28 MMOL/L (ref 22–29)
COLOR: YELLOW
CREAT SERPL-MCNC: 1.1 MG/DL (ref 0.5–1)
EOSINOPHILS ABSOLUTE: 0.1 E9/L (ref 0.05–0.5)
EOSINOPHILS RELATIVE PERCENT: 2.4 % (ref 0–6)
GFR AFRICAN AMERICAN: >60
GFR NON-AFRICAN AMERICAN: 51 ML/MIN/1.73
GLUCOSE BLD-MCNC: 104 MG/DL (ref 74–99)
GLUCOSE URINE: NEGATIVE MG/DL
HCT VFR BLD CALC: 42.6 % (ref 34–48)
HDLC SERPL-MCNC: 62 MG/DL
HEMOGLOBIN: 14.7 G/DL (ref 11.5–15.5)
IMMATURE GRANULOCYTES #: 0.01 E9/L
IMMATURE GRANULOCYTES %: 0.2 % (ref 0–5)
KETONES, URINE: 15 MG/DL
LDL CHOLESTEROL CALCULATED: 118 MG/DL (ref 0–99)
LEUKOCYTE ESTERASE, URINE: ABNORMAL
LYMPHOCYTES ABSOLUTE: 1.22 E9/L (ref 1.5–4)
LYMPHOCYTES RELATIVE PERCENT: 29.2 % (ref 20–42)
MCH RBC QN AUTO: 33 PG (ref 26–35)
MCHC RBC AUTO-ENTMCNC: 34.5 % (ref 32–34.5)
MCV RBC AUTO: 95.5 FL (ref 80–99.9)
MONOCYTES ABSOLUTE: 0.37 E9/L (ref 0.1–0.95)
MONOCYTES RELATIVE PERCENT: 8.9 % (ref 2–12)
NEUTROPHILS ABSOLUTE: 2.45 E9/L (ref 1.8–7.3)
NEUTROPHILS RELATIVE PERCENT: 58.6 % (ref 43–80)
NITRITE, URINE: NEGATIVE
PDW BLD-RTO: 13.3 FL (ref 11.5–15)
PH UA: 7.5 (ref 5–9)
PLATELET # BLD: 189 E9/L (ref 130–450)
PMV BLD AUTO: 8.8 FL (ref 7–12)
POTASSIUM SERPL-SCNC: 4.5 MMOL/L (ref 3.5–5)
PROTEIN UA: NEGATIVE MG/DL
RBC # BLD: 4.46 E12/L (ref 3.5–5.5)
RBC UA: ABNORMAL /HPF (ref 0–2)
SODIUM BLD-SCNC: 144 MMOL/L (ref 132–146)
SPECIFIC GRAVITY UA: 1.02 (ref 1–1.03)
T4 TOTAL: 9.3 MCG/DL (ref 4.5–11.7)
TOTAL PROTEIN: 6.6 G/DL (ref 6.4–8.3)
TRIGL SERPL-MCNC: 159 MG/DL (ref 0–149)
TSH SERPL DL<=0.05 MIU/L-ACNC: 0.82 UIU/ML (ref 0.27–4.2)
UROBILINOGEN, URINE: 0.2 E.U./DL
VITAMIN D 25-HYDROXY: 44 NG/ML (ref 30–100)
VLDLC SERPL CALC-MCNC: 32 MG/DL
WBC # BLD: 4.2 E9/L (ref 4.5–11.5)
WBC UA: ABNORMAL /HPF (ref 0–5)

## 2020-07-25 PROCEDURE — 80061 LIPID PANEL: CPT

## 2020-07-25 PROCEDURE — 84443 ASSAY THYROID STIM HORMONE: CPT

## 2020-07-25 PROCEDURE — 84436 ASSAY OF TOTAL THYROXINE: CPT

## 2020-07-25 PROCEDURE — 85025 COMPLETE CBC W/AUTO DIFF WBC: CPT

## 2020-07-25 PROCEDURE — 82306 VITAMIN D 25 HYDROXY: CPT

## 2020-07-25 PROCEDURE — 36415 COLL VENOUS BLD VENIPUNCTURE: CPT

## 2020-07-25 PROCEDURE — 81001 URINALYSIS AUTO W/SCOPE: CPT

## 2020-07-25 PROCEDURE — 80053 COMPREHEN METABOLIC PANEL: CPT

## 2020-07-29 ENCOUNTER — OFFICE VISIT (OUTPATIENT)
Dept: PRIMARY CARE CLINIC | Age: 60
End: 2020-07-29
Payer: COMMERCIAL

## 2020-07-29 VITALS
BODY MASS INDEX: 27.98 KG/M2 | SYSTOLIC BLOOD PRESSURE: 128 MMHG | DIASTOLIC BLOOD PRESSURE: 82 MMHG | WEIGHT: 184 LBS | TEMPERATURE: 98.1 F

## 2020-07-29 PROCEDURE — 99396 PREV VISIT EST AGE 40-64: CPT | Performed by: FAMILY MEDICINE

## 2020-07-29 RX ORDER — BUPROPION HYDROCHLORIDE 150 MG/1
150 TABLET ORAL EVERY MORNING
Qty: 90 TABLET | Refills: 5 | Status: SHIPPED
Start: 2020-07-29 | End: 2020-12-11

## 2020-07-29 RX ORDER — CYCLOBENZAPRINE HCL 10 MG
10 TABLET ORAL 2 TIMES DAILY PRN
Qty: 90 TABLET | Refills: 5 | Status: SHIPPED
Start: 2020-07-29 | End: 2020-12-15 | Stop reason: SDUPTHER

## 2020-07-29 RX ORDER — PANTOPRAZOLE SODIUM 40 MG/1
40 TABLET, DELAYED RELEASE ORAL DAILY
Qty: 90 TABLET | Refills: 5 | Status: SHIPPED
Start: 2020-07-29 | End: 2020-12-11

## 2020-07-29 ASSESSMENT — ENCOUNTER SYMPTOMS
ALLERGIC/IMMUNOLOGIC NEGATIVE: 1
RESPIRATORY NEGATIVE: 1
GASTROINTESTINAL NEGATIVE: 1
EYES NEGATIVE: 1

## 2020-07-29 ASSESSMENT — PATIENT HEALTH QUESTIONNAIRE - PHQ9
SUM OF ALL RESPONSES TO PHQ QUESTIONS 1-9: 0
SUM OF ALL RESPONSES TO PHQ QUESTIONS 1-9: 0
2. FEELING DOWN, DEPRESSED OR HOPELESS: 0
SUM OF ALL RESPONSES TO PHQ9 QUESTIONS 1 & 2: 0
1. LITTLE INTEREST OR PLEASURE IN DOING THINGS: 0

## 2020-07-29 NOTE — PROGRESS NOTES
20  Name: Estefani Tellez    : 1960    Sex: female    Age: 63 y.o. Subjective:  Chief Complaint: Patient is here for one year  Ck   r e wellness     thryoid  anx     Feels great  Doing intermittent  Fating and  Feels  Great   Wt dwon 15 lbs col inc  crea inc  Uses flex prn rare  She wants to  Dec  weleburin and  Tape roff  On protonix   For yrs   Told to taper        Review of Systems   Constitutional: Negative. HENT: Negative. Eyes: Negative. Respiratory: Negative. Cardiovascular: Negative. Gastrointestinal: Negative. Endocrine: Negative. Genitourinary: Negative. Musculoskeletal: Negative. Skin: Negative. Allergic/Immunologic: Negative. Neurological: Negative. Hematological: Negative. Psychiatric/Behavioral: Negative.           Current Outpatient Medications:     cyclobenzaprine (FLEXERIL) 10 MG tablet, Take 1 tablet by mouth 2 times daily as needed for Muscle spasms, Disp: 90 tablet, Rfl: 5    buPROPion (WELLBUTRIN XL) 150 MG extended release tablet, Take 1 tablet by mouth every morning, Disp: 90 tablet, Rfl: 5    pantoprazole (PROTONIX) 40 MG tablet, Take 1 tablet by mouth daily, Disp: 90 tablet, Rfl: 5    Estradiol-Estriol-Progesterone (BIEST/PROGESTERONE TD), Place onto the skin 0.5 ML qhs, Disp: , Rfl:     ELIDEL 1 % cream, , Disp: , Rfl:     thyroid (ARMOUR THYROID) 120 MG tablet, Take 120 mg by mouth daily No longer takes, Disp: , Rfl:   No Known Allergies  Social History     Socioeconomic History    Marital status:      Spouse name: Not on file    Number of children: Not on file    Years of education: Not on file    Highest education level: Not on file   Occupational History    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   Social Needs    Financial resource strain: Not on file    Food insecurity     Worry: Not on file     Inability: Not on file    Transportation needs     Medical: Not on file     Non-medical: Not on file Tobacco Use    Smoking status: Former Smoker     Types: Cigarettes     Last attempt to quit: 2012     Years since quittin.5    Smokeless tobacco: Never Used    Tobacco comment: 2012   Substance and Sexual Activity    Alcohol use: Yes     Comment: social    Drug use: No    Sexual activity: Yes     Partners: Male   Lifestyle    Physical activity     Days per week: Not on file     Minutes per session: Not on file    Stress: Not on file   Relationships    Social connections     Talks on phone: Not on file     Gets together: Not on file     Attends Moravian service: Not on file     Active member of club or organization: Not on file     Attends meetings of clubs or organizations: Not on file     Relationship status: Not on file    Intimate partner violence     Fear of current or ex partner: Not on file     Emotionally abused: Not on file     Physically abused: Not on file     Forced sexual activity: Not on file   Other Topics Concern    Not on file   Social History Narrative        HEMATOCHROMATOSIS    SLIGHT HYPOGLYDEMIA    PMS    LOW PHOSPOHOROUS IN PAST    LIPID    LOW [address] [facility]    SMOKER--QUIT 12    ABLATION UTERUS    BTS    L OVARY REMOVED DUE TO CYST    LOW BACK PAIN---DISC---YARAB WITH SHOTS AND SAW [facility]    Ekaterinakanchan Dietz  1960 Page #2    P- HYSTER 10-10    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[employer]----PROMOTED TO SENIOR      MAIDEN NAME ISMA    GRANDMOTHER ON MOTHERS SIDE WITH BREAST CA    ER VISIT  WITH ELEV LFT AND GB SLUDGE    ELEV LFT 4-12    TMT     ELEV BP 4-12    LEFT FOOT OR 11-12    SLEEP APNEA---REFUSES TX    GERD     ELEV LFT -    GL SLUDGE -----STONES ON US ----GB OUT DR ZARA MASTERSON WITH MILED GERD -    COLONOSCOPY AND EGD  ZARA----7 TO 10 YRS--- MELANOXIX COLI---POSS FROM    OLD HEMORRHOID OR PER PT    EGD  DR Funmi Yoon 87 STARTED HRT AND ARMOUR THRYOID     GRANDMOHTER ON MOTHERS SIDE WITH BREAST CA    EVAL Mental Status: She is alert and oriented to person, place, and time. Psychiatric:         Behavior: Behavior normal.         Yasmany Silverio was seen today for annual exam.    Diagnoses and all orders for this visit:    Encounter for annual general medical examination with abnormal findings in adult  -     CBC Auto Differential; Future  -     Comprehensive Metabolic Panel; Future  -     Hemoglobin A1C; Future  -     Lipid Panel; Future  -     T4; Future  -     TSH without Reflex; Future  -     INSULIN, TOTAL; Future    Gastroesophageal reflux disease without esophagitis  -     pantoprazole (PROTONIX) 40 MG tablet; Take 1 tablet by mouth daily    Acquired hypothyroidism  -     T4; Future  -     TSH without Reflex; Future    Anxiety  -     buPROPion (WELLBUTRIN XL) 150 MG extended release tablet; Take 1 tablet by mouth every morning    Pre-diabetes  -     Hemoglobin A1C; Future  -     Lipid Panel; Future  -     INSULIN, TOTAL; Future    Myalgia  -     cyclobenzaprine (FLEXERIL) 10 MG tablet; Take 1 tablet by mouth 2 times daily as needed for Muscle spasms        Comments: try  To get off the  protoni    She wants off    [place] tyroid a dn told to do slwoly      Watch  crea   A great deal of time spent reviewing medications, diet, exercise, social issues. Also reviewing the chart before entering the room with patient and finishing charting after leaving patient's room. More than half of that time was spent face to face with the patient in counseling and coordinating care. She requet inslin  level  Dec wellbutirn  150    Follow Up: Return in about 4 months (around 11/29/2020) for lab before.      Seen by:  Kali Cullen DO

## 2020-12-04 ENCOUNTER — HOSPITAL ENCOUNTER (OUTPATIENT)
Age: 60
Discharge: HOME | End: 2020-12-04
Payer: COMMERCIAL

## 2020-12-04 LAB
ALBUMIN SERPL-MCNC: 4.3 G/DL (ref 3.5–5.2)
ALP BLD-CCNC: 60 U/L (ref 35–104)
ALT SERPL-CCNC: 21 U/L (ref 0–32)
ANION GAP SERPL CALCULATED.3IONS-SCNC: 9 MMOL/L (ref 7–16)
AST SERPL-CCNC: 17 U/L (ref 0–31)
BASOPHILS ABSOLUTE: 0.01 E9/L (ref 0–0.2)
BASOPHILS RELATIVE PERCENT: 0.2 % (ref 0–2)
BILIRUB SERPL-MCNC: 0.5 MG/DL (ref 0–1.2)
BUN BLDV-MCNC: 14 MG/DL (ref 6–20)
CALCIUM SERPL-MCNC: 9.6 MG/DL (ref 8.6–10.2)
CHLORIDE BLD-SCNC: 107 MMOL/L (ref 98–107)
CHOLESTEROL, TOTAL: 209 MG/DL (ref 0–199)
CO2: 26 MMOL/L (ref 22–29)
CREAT SERPL-MCNC: 0.8 MG/DL (ref 0.5–1)
EOSINOPHILS ABSOLUTE: 0.08 E9/L (ref 0.05–0.5)
EOSINOPHILS RELATIVE PERCENT: 1.7 % (ref 0–6)
GFR AFRICAN AMERICAN: >60
GFR NON-AFRICAN AMERICAN: >60 ML/MIN/1.73
GLUCOSE BLD-MCNC: 103 MG/DL (ref 74–99)
HBA1C MFR BLD: 4.7 % (ref 4–5.6)
HCT VFR BLD CALC: 42.5 % (ref 34–48)
HDLC SERPL-MCNC: 60 MG/DL
HEMOGLOBIN: 14.3 G/DL (ref 11.5–15.5)
IMMATURE GRANULOCYTES #: 0.02 E9/L
IMMATURE GRANULOCYTES %: 0.4 % (ref 0–5)
LDL CHOLESTEROL CALCULATED: 131 MG/DL (ref 0–99)
LYMPHOCYTES ABSOLUTE: 1.05 E9/L (ref 1.5–4)
LYMPHOCYTES RELATIVE PERCENT: 22.8 % (ref 20–42)
MCH RBC QN AUTO: 32 PG (ref 26–35)
MCHC RBC AUTO-ENTMCNC: 33.6 % (ref 32–34.5)
MCV RBC AUTO: 95.1 FL (ref 80–99.9)
MONOCYTES ABSOLUTE: 0.32 E9/L (ref 0.1–0.95)
MONOCYTES RELATIVE PERCENT: 6.9 % (ref 2–12)
NEUTROPHILS ABSOLUTE: 3.13 E9/L (ref 1.8–7.3)
NEUTROPHILS RELATIVE PERCENT: 68 % (ref 43–80)
PDW BLD-RTO: 12.8 FL (ref 11.5–15)
PLATELET # BLD: 172 E9/L (ref 130–450)
PMV BLD AUTO: 8.8 FL (ref 7–12)
POTASSIUM SERPL-SCNC: 4.8 MMOL/L (ref 3.5–5)
RBC # BLD: 4.47 E12/L (ref 3.5–5.5)
SODIUM BLD-SCNC: 142 MMOL/L (ref 132–146)
TOTAL PROTEIN: 6.9 G/DL (ref 6.4–8.3)
TRIGL SERPL-MCNC: 89 MG/DL (ref 0–149)
TSH SERPL DL<=0.05 MIU/L-ACNC: 1.31 UIU/ML (ref 0.27–4.2)
VLDLC SERPL CALC-MCNC: 18 MG/DL
WBC # BLD: 4.6 E9/L (ref 4.5–11.5)

## 2020-12-04 PROCEDURE — 85025 COMPLETE CBC W/AUTO DIFF WBC: CPT

## 2020-12-04 PROCEDURE — 83525 ASSAY OF INSULIN: CPT

## 2020-12-04 PROCEDURE — 83036 HEMOGLOBIN GLYCOSYLATED A1C: CPT

## 2020-12-04 PROCEDURE — 84436 ASSAY OF TOTAL THYROXINE: CPT

## 2020-12-04 PROCEDURE — 84443 ASSAY THYROID STIM HORMONE: CPT

## 2020-12-04 PROCEDURE — 80061 LIPID PANEL: CPT

## 2020-12-04 PROCEDURE — 36415 COLL VENOUS BLD VENIPUNCTURE: CPT

## 2020-12-04 PROCEDURE — 80053 COMPREHEN METABOLIC PANEL: CPT

## 2020-12-06 LAB
INSULIN: 7 UIU/ML
T4 TOTAL: 9 MCG/DL (ref 4.5–11.7)

## 2020-12-11 ENCOUNTER — OFFICE VISIT (OUTPATIENT)
Dept: PRIMARY CARE CLINIC | Age: 60
End: 2020-12-11
Payer: COMMERCIAL

## 2020-12-11 VITALS
BODY MASS INDEX: 28.04 KG/M2 | WEIGHT: 185 LBS | DIASTOLIC BLOOD PRESSURE: 78 MMHG | SYSTOLIC BLOOD PRESSURE: 127 MMHG | HEIGHT: 68 IN | TEMPERATURE: 98.6 F

## 2020-12-11 PROBLEM — N18.2 CHRONIC KIDNEY DISEASE, STAGE 2 (MILD): Chronic | Status: ACTIVE | Noted: 2020-12-11

## 2020-12-11 PROBLEM — E88.819 INSULIN RESISTANCE: Status: ACTIVE | Noted: 2020-12-11

## 2020-12-11 PROBLEM — E88.81 INSULIN RESISTANCE: Status: ACTIVE | Noted: 2020-12-11

## 2020-12-11 PROBLEM — K21.9 GASTROESOPHAGEAL REFLUX DISEASE WITHOUT ESOPHAGITIS: Chronic | Status: ACTIVE | Noted: 2019-06-24

## 2020-12-11 PROBLEM — F41.9 ANXIETY: Chronic | Status: ACTIVE | Noted: 2019-06-24

## 2020-12-11 PROBLEM — E88.81 INSULIN RESISTANCE: Chronic | Status: ACTIVE | Noted: 2020-12-11

## 2020-12-11 PROBLEM — E88.819 INSULIN RESISTANCE: Chronic | Status: ACTIVE | Noted: 2020-12-11

## 2020-12-11 PROBLEM — N18.2 CHRONIC KIDNEY DISEASE, STAGE 2 (MILD): Status: ACTIVE | Noted: 2020-12-11

## 2020-12-11 PROCEDURE — 1036F TOBACCO NON-USER: CPT | Performed by: FAMILY MEDICINE

## 2020-12-11 PROCEDURE — G8417 CALC BMI ABV UP PARAM F/U: HCPCS | Performed by: FAMILY MEDICINE

## 2020-12-11 PROCEDURE — 3017F COLORECTAL CA SCREEN DOC REV: CPT | Performed by: FAMILY MEDICINE

## 2020-12-11 PROCEDURE — G8428 CUR MEDS NOT DOCUMENT: HCPCS | Performed by: FAMILY MEDICINE

## 2020-12-11 PROCEDURE — G8484 FLU IMMUNIZE NO ADMIN: HCPCS | Performed by: FAMILY MEDICINE

## 2020-12-11 PROCEDURE — 99214 OFFICE O/P EST MOD 30 MIN: CPT | Performed by: FAMILY MEDICINE

## 2020-12-11 RX ORDER — BLOOD-GLUCOSE TRANSMITTER
1 EACH MISCELLANEOUS
Qty: 1 EACH | Refills: 12 | Status: SHIPPED
Start: 2020-12-11 | End: 2022-03-04

## 2020-12-11 ASSESSMENT — ENCOUNTER SYMPTOMS
ALLERGIC/IMMUNOLOGIC NEGATIVE: 1
GASTROINTESTINAL NEGATIVE: 1
EYES NEGATIVE: 1
RESPIRATORY NEGATIVE: 1

## 2020-12-11 ASSESSMENT — PATIENT HEALTH QUESTIONNAIRE - PHQ9
SUM OF ALL RESPONSES TO PHQ QUESTIONS 1-9: 0
SUM OF ALL RESPONSES TO PHQ QUESTIONS 1-9: 0
1. LITTLE INTEREST OR PLEASURE IN DOING THINGS: 0
SUM OF ALL RESPONSES TO PHQ9 QUESTIONS 1 & 2: 0
2. FEELING DOWN, DEPRESSED OR HOPELESS: 0
SUM OF ALL RESPONSES TO PHQ QUESTIONS 1-9: 0

## 2020-12-11 NOTE — PROGRESS NOTES
20  Name: Leanne Raymundo    : 1960    Sex: female    Age: 62 y.o. Subjective:  Chief Complaint: Patient is here for    6 mo ck  r e   thryoid   bs    anx     Feel well         She is off hte protonix an dok with out it      On  Transdermal   Est     crea bck to normal     Doing inter  Fating      And helsp  A lot  Fasts  18-20 hrs a day  Feels great      Review of Systems   Constitutional: Negative. HENT: Negative. Eyes: Negative. Respiratory: Negative. Cardiovascular: Negative. Gastrointestinal: Negative. Endocrine: Negative. Genitourinary: Negative. Musculoskeletal: Negative. Skin: Negative. Allergic/Immunologic: Negative. Neurological: Negative. Hematological: Negative. Psychiatric/Behavioral: Negative.           Current Outpatient Medications:     Continuous Blood Gluc Transmit (DEXCOM G5 MOBILE TRANSMITTER) MISC, 1 kit by Does not apply route every 2 hours Insurance will not cover    She wants to pay cash for it, Disp: 1 each, Rfl: 12    cyclobenzaprine (FLEXERIL) 10 MG tablet, Take 1 tablet by mouth 2 times daily as needed for Muscle spasms, Disp: 90 tablet, Rfl: 5    Estradiol-Estriol-Progesterone (BIEST/PROGESTERONE TD), Place onto the skin 0.5 ML qhs, Disp: , Rfl:     ELIDEL 1 % cream, , Disp: , Rfl:     thyroid (ARMOUR THYROID) 120 MG tablet, Take 120 mg by mouth daily No longer takes, Disp: , Rfl:   No Known Allergies  Social History     Socioeconomic History    Marital status:      Spouse name: Not on file    Number of children: Not on file    Years of education: Not on file    Highest education level: Not on file   Occupational History    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   Social Needs    Financial resource strain: Not on file    Food insecurity     Worry: Not on file     Inability: Not on file    Transportation needs     Medical: Not on file     Non-medical: Not on file   Tobacco Use    Smoking status: Former Smoker     Types: Cigarettes     Last attempt to quit: 2012     Years since quittin.9    Smokeless tobacco: Never Used    Tobacco comment: 2012   Substance and Sexual Activity    Alcohol use: Yes     Comment: social    Drug use: No    Sexual activity: Yes     Partners: Male   Lifestyle    Physical activity     Days per week: Not on file     Minutes per session: Not on file    Stress: Not on file   Relationships    Social connections     Talks on phone: Not on file     Gets together: Not on file     Attends Anabaptism service: Not on file     Active member of club or organization: Not on file     Attends meetings of clubs or organizations: Not on file     Relationship status: Not on file    Intimate partner violence     Fear of current or ex partner: Not on file     Emotionally abused: Not on file     Physically abused: Not on file     Forced sexual activity: Not on file   Other Topics Concern    Not on file   Social History Narrative        HEMATOCHROMATOSIS    SLIGHT HYPOGLYDEMIA    PMS    LOW PHOSPOHOROUS IN PAST    LIPID    LOW [address] [facility]    SMOKER--QUIT 12    ABLATION UTERUS    BTS    L OVARY REMOVED DUE TO CYST    LOW BACK PAIN---DISC---YARAB WITH SHOTS AND SAW [facility]    Madina Reagan Mirela  1960 Page #2    P- HYSTER 10-10    MGR [employer]----PROMOTED TO SENIOR      MAIDEN NAME ISMA    GRANDMOTHER ON MOTHERS SIDE WITH BREAST CA    ER VISIT  WITH ELEV LFT AND GB SLUDGE    ELEV LFT 4-12    TMT     ELEV BP 4-12    LEFT FOOT OR 11-12    SLEEP APNEA---REFUSES TX    GERD     ELEV LFT -    GL SLUDGE -----STONES ON US ----GB OUT DR ZARA MASTERSON WITH MILED GERD -    COLONOSCOPY AND EGD  ZARA----7 TO 10 YRS--- MELANOXIX COLI---POSS FROM    OLD HEMORRHOID OR PER PT    EGD  DR Funmi Yoon 87 STARTED HRT AND ARMOUR THRYOID     GRANDMOHTER ON MOTHERS SIDE WITH BREAST CA    EVAL WITH COUNSELOR 10- FOR OVER EATING

## 2020-12-15 RX ORDER — CYCLOBENZAPRINE HCL 10 MG
10 TABLET ORAL 2 TIMES DAILY PRN
Qty: 90 TABLET | Refills: 3 | Status: SHIPPED
Start: 2020-12-15 | End: 2021-08-18 | Stop reason: SDUPTHER

## 2021-01-03 ENCOUNTER — OFFICE VISIT (OUTPATIENT)
Dept: PRIMARY CARE CLINIC | Age: 61
End: 2021-01-03
Payer: COMMERCIAL

## 2021-01-03 VITALS
WEIGHT: 179 LBS | RESPIRATION RATE: 12 BRPM | OXYGEN SATURATION: 99 % | DIASTOLIC BLOOD PRESSURE: 82 MMHG | HEART RATE: 85 BPM | BODY MASS INDEX: 27.13 KG/M2 | HEIGHT: 68 IN | SYSTOLIC BLOOD PRESSURE: 134 MMHG | TEMPERATURE: 97.6 F

## 2021-01-03 DIAGNOSIS — Z20.822 CLOSE EXPOSURE TO COVID-19 VIRUS: Primary | ICD-10-CM

## 2021-01-03 DIAGNOSIS — U07.1 COVID-19: ICD-10-CM

## 2021-01-03 LAB
Lab: ABNORMAL
QC PASS/FAIL: ABNORMAL
SARS-COV-2, POC: DETECTED

## 2021-01-03 PROCEDURE — G8427 DOCREV CUR MEDS BY ELIG CLIN: HCPCS | Performed by: FAMILY MEDICINE

## 2021-01-03 PROCEDURE — 3017F COLORECTAL CA SCREEN DOC REV: CPT | Performed by: FAMILY MEDICINE

## 2021-01-03 PROCEDURE — G8417 CALC BMI ABV UP PARAM F/U: HCPCS | Performed by: FAMILY MEDICINE

## 2021-01-03 PROCEDURE — 1036F TOBACCO NON-USER: CPT | Performed by: FAMILY MEDICINE

## 2021-01-03 PROCEDURE — G8484 FLU IMMUNIZE NO ADMIN: HCPCS | Performed by: FAMILY MEDICINE

## 2021-01-03 PROCEDURE — 99213 OFFICE O/P EST LOW 20 MIN: CPT | Performed by: FAMILY MEDICINE

## 2021-01-03 PROCEDURE — 87426 SARSCOV CORONAVIRUS AG IA: CPT | Performed by: FAMILY MEDICINE

## 2021-01-03 RX ORDER — AZITHROMYCIN 250 MG/1
250 TABLET, FILM COATED ORAL SEE ADMIN INSTRUCTIONS
Qty: 6 TABLET | Refills: 0 | Status: SHIPPED | OUTPATIENT
Start: 2021-01-03 | End: 2021-01-08

## 2021-01-03 NOTE — PROGRESS NOTES
1/3/21  Name: Eve Norris    : 1960    Sex: female    Age: 62 y.o. Subjective:  Chief Complaint: Patient is here for loss of taste  Myalgia           hus  positive. No chills or temperature no chest pain or shortness of breath. Some myalgias      Review of Systems   Constitutional: Negative. Negative for chills, diaphoresis, fatigue and fever. Loss of taste   HENT: Negative. Eyes: Negative. Respiratory: Negative. Cardiovascular: Negative. Gastrointestinal: Negative. Endocrine: Negative. Genitourinary: Negative. Musculoskeletal: Positive for myalgias. Skin: Negative. Allergic/Immunologic: Negative. Neurological: Negative. Hematological: Negative. Psychiatric/Behavioral: Negative.           Current Outpatient Medications:     azithromycin (ZITHROMAX) 250 MG tablet, Take 1 tablet by mouth See Admin Instructions for 5 days 500mg on day 1 followed by 250mg on days 2 - 5, Disp: 6 tablet, Rfl: 0    cyclobenzaprine (FLEXERIL) 10 MG tablet, Take 1 tablet by mouth 2 times daily as needed for Muscle spasms, Disp: 90 tablet, Rfl: 3    Continuous Blood Gluc Transmit (DEXCOM G5 MOBILE TRANSMITTER) MISC, 1 kit by Does not apply route every 2 hours Insurance will not cover    She wants to pay cash for it, Disp: 1 each, Rfl: 12    Estradiol-Estriol-Progesterone (BIEST/PROGESTERONE TD), Place onto the skin 0.5 ML qhs, Disp: , Rfl:     ELIDEL 1 % cream, , Disp: , Rfl:     thyroid (ARMOUR THYROID) 120 MG tablet, Take 120 mg by mouth daily No longer takes, Disp: , Rfl:   No Known Allergies  Social History     Socioeconomic History    Marital status:      Spouse name: Not on file    Number of children: Not on file    Years of education: Not on file    Highest education level: Not on file   Occupational History    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   Social Needs    Financial resource strain: Not on file    Food insecurity Worry: Not on file     Inability: Not on file    Transportation needs     Medical: Not on file     Non-medical: Not on file   Tobacco Use    Smoking status: Former Smoker     Types: Cigarettes     Quit date: 2012     Years since quittin.0    Smokeless tobacco: Never Used    Tobacco comment: 2012   Substance and Sexual Activity    Alcohol use: Yes     Comment: social    Drug use: No    Sexual activity: Yes     Partners: Male   Lifestyle    Physical activity     Days per week: Not on file     Minutes per session: Not on file    Stress: Not on file   Relationships    Social connections     Talks on phone: Not on file     Gets together: Not on file     Attends Mu-ism service: Not on file     Active member of club or organization: Not on file     Attends meetings of clubs or organizations: Not on file     Relationship status: Not on file    Intimate partner violence     Fear of current or ex partner: Not on file     Emotionally abused: Not on file     Physically abused: Not on file     Forced sexual activity: Not on file   Other Topics Concern    Not on file   Social History Narrative        HEMATOCHROMATOSIS    SLIGHT HYPOGLYDEMIA    PMS    LOW PHOSPOHOROUS IN PAST    LIPID    LOW [facility]    SMOKER--QUIT 12    ABLATION UTERUS    BTS    L OVARY REMOVED DUE TO CYST    LOW BACK PAIN---DISC---YARAB WITH SHOTS AND SAW [facility]    Palma Dietz  1960 Page #2    P- HYSTER 10-10    MGR 22 [employer]----PROMOTED TO SENIOR      [place] NAME ISMA    GRANDMOTHER ON MOTHERS SIDE WITH BREAST CA    ER VISIT  WITH ELEV LFT AND GB SLUDGE    ELEV LFT 4-12    TMT     ELEV BP 4-12    LEFT FOOT OR 11-12    SLEEP APNEA---REFUSES TX    GERD     ELEV LFT -13    GL SLUDGE -----STONES ON US ----GB OUT  [address] MASTERSON WITH MILED GERD     COLONOSCOPY AND EGD  YURICH----7 TO 10 YRS--- MELANOXIX COLI---POSS FROM    OLD HEMORRHOID OR PER PT EGD  DR Funmi [place] [address] STARTED HRT AND ARMOUR THRYOID 7-14    GRANDMOHTER ON MOTHERS SIDE WITH BREAST CA    EVAL WITH COUNSELOR 10-16 FOR OVER EATING    EVAL DR ELLER  ELEV D DIMER AND CTA NEG------ ECHO DONE---- VQ NEG----    UGI  WITH MINIMAL GERD    ELEV CREA 1.1 ON  AND ON 4-10 AFTER CT CHEST WAS 1,14    NEG TMT AND ECHO  DR SPANN----OFFERED BETA BLOCKER AND PT REF    RIGHT BUNION FOOT OR DR Rj Recinos     LOST JOB 18    LEFT FIRST TOE OR DR Rj Recinos     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 [employer]     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 [employer]      Er with burt with stress    3-19       Ct  Head and neck ok    Gyn [place] gave armour thyroid  2018    elev   crea   7-20      Past Medical History:   Diagnosis Date    Abnormal Pap smear of cervix     Gallstones     Menopausal symptoms     Osteopenia     SOB (shortness of breath)     In am and Pm resting    Weight gain      Family History   Problem Relation Age of Onset    Other Father          [id number] Aortic aneurysm 10 cm AAA repair    No Known Problems Sister     No Known Problems Brother     No Known Problems Brother       Past Surgical History:   Procedure Laterality Date    FOOT NEUROMA SURGERY Bilateral     DR Dean Parlaura    HYSTERECTOMY      DR Leonard Lim    ROTATOR CUFF REPAIR  ,     RIGHT      Vitals:    21 1021   BP: 134/82   Pulse: 85   Resp: 12   Temp: 97.6 °F (36.4 °C)   SpO2: 99%   Weight: 179 lb (81.2 kg)   Height: 5' 8\" (1.727 m)       Objective:    Physical Exam  Vitals signs reviewed. Constitutional:       Appearance: She is well-developed. HENT:      Head: Normocephalic. Eyes:      Pupils: Pupils are equal, round, and reactive to light. Neck:      Musculoskeletal: Normal range of motion. Cardiovascular:      Rate and Rhythm: Normal rate and regular rhythm.    Pulmonary:

## 2021-03-09 ENCOUNTER — OFFICE VISIT (OUTPATIENT)
Dept: PRIMARY CARE CLINIC | Age: 61
End: 2021-03-09
Payer: COMMERCIAL

## 2021-03-09 ENCOUNTER — OFFICE VISIT (OUTPATIENT)
Dept: PODIATRY | Age: 61
End: 2021-03-09
Payer: COMMERCIAL

## 2021-03-09 VITALS
DIASTOLIC BLOOD PRESSURE: 82 MMHG | BODY MASS INDEX: 27.83 KG/M2 | SYSTOLIC BLOOD PRESSURE: 134 MMHG | WEIGHT: 183 LBS | TEMPERATURE: 98.6 F

## 2021-03-09 VITALS
HEIGHT: 68 IN | DIASTOLIC BLOOD PRESSURE: 82 MMHG | BODY MASS INDEX: 27.74 KG/M2 | SYSTOLIC BLOOD PRESSURE: 134 MMHG | WEIGHT: 183 LBS | TEMPERATURE: 98.6 F

## 2021-03-09 DIAGNOSIS — L02.611 ABSCESS OF GREAT TOE OF RIGHT FOOT: Primary | ICD-10-CM

## 2021-03-09 DIAGNOSIS — S90.422A BLISTER OF LEFT GREAT TOE, INITIAL ENCOUNTER: Primary | ICD-10-CM

## 2021-03-09 PROCEDURE — G8417 CALC BMI ABV UP PARAM F/U: HCPCS | Performed by: PODIATRIST

## 2021-03-09 PROCEDURE — 1036F TOBACCO NON-USER: CPT | Performed by: PODIATRIST

## 2021-03-09 PROCEDURE — G8417 CALC BMI ABV UP PARAM F/U: HCPCS | Performed by: FAMILY MEDICINE

## 2021-03-09 PROCEDURE — G8484 FLU IMMUNIZE NO ADMIN: HCPCS | Performed by: PODIATRIST

## 2021-03-09 PROCEDURE — 1036F TOBACCO NON-USER: CPT | Performed by: FAMILY MEDICINE

## 2021-03-09 PROCEDURE — G8428 CUR MEDS NOT DOCUMENT: HCPCS | Performed by: FAMILY MEDICINE

## 2021-03-09 PROCEDURE — G8484 FLU IMMUNIZE NO ADMIN: HCPCS | Performed by: FAMILY MEDICINE

## 2021-03-09 PROCEDURE — 3017F COLORECTAL CA SCREEN DOC REV: CPT | Performed by: PODIATRIST

## 2021-03-09 PROCEDURE — 3017F COLORECTAL CA SCREEN DOC REV: CPT | Performed by: FAMILY MEDICINE

## 2021-03-09 PROCEDURE — G8427 DOCREV CUR MEDS BY ELIG CLIN: HCPCS | Performed by: PODIATRIST

## 2021-03-09 PROCEDURE — 99203 OFFICE O/P NEW LOW 30 MIN: CPT | Performed by: PODIATRIST

## 2021-03-09 PROCEDURE — 99213 OFFICE O/P EST LOW 20 MIN: CPT | Performed by: FAMILY MEDICINE

## 2021-03-09 RX ORDER — ETODOLAC 400 MG/1
400 TABLET, FILM COATED ORAL 2 TIMES DAILY
Qty: 180 TABLET | Refills: 1 | Status: SHIPPED
Start: 2021-03-09 | End: 2021-04-27

## 2021-03-09 RX ORDER — CEPHALEXIN 500 MG/1
500 CAPSULE ORAL 3 TIMES DAILY
Qty: 21 CAPSULE | Refills: 0 | Status: SHIPPED
Start: 2021-03-09 | End: 2021-03-20

## 2021-03-09 ASSESSMENT — ENCOUNTER SYMPTOMS
GASTROINTESTINAL NEGATIVE: 1
ROS SKIN COMMENTS: SEE  HPI
RESPIRATORY NEGATIVE: 1

## 2021-03-09 ASSESSMENT — PATIENT HEALTH QUESTIONNAIRE - PHQ9
2. FEELING DOWN, DEPRESSED OR HOPELESS: 0
SUM OF ALL RESPONSES TO PHQ QUESTIONS 1-9: 0
SUM OF ALL RESPONSES TO PHQ9 QUESTIONS 1 & 2: 0

## 2021-03-09 NOTE — PROGRESS NOTES
[facility] PODIATRY  [address]  [place] [facility] [address]  Dept: [phone]  Dept Fax: [phone]    NEW PATIENT PROGRESS NOTE  Date of patient's visit: 3/9/2021  Patient's Name:  Fannie Felipe YOB: 1960            Patient Care Team:  Bhavana Aguilar DO as PCP - [address] Satish Cavazos DO as PCP - [address] Charlotte Vázquezled Provider  Mariah Wasserman DO as Consulting Physician (Cardiology)        Chief Complaint   Patient presents with   Sherren Heimlich Doctor     Dr. Joi Alberts put her on Keflex today     Foot Pain     red blood blister under great toe left foot went to [facility] yesterday he did xrays and a sleeve on toe that caused her pain. Xrays were normal        HPI  HPI:   Fannie Felipe is a 62 y.o. female who presents to the office today complaining of left great toe pain. This new patient is seen today as an emergency. Patient states that she developed a blood blister under her left great toe patient states that she was seen several days ago by another doctor who put her on foam padding and an x-ray the x-ray showed no fracture dislocations. .      No Known Allergies    Past Medical History:   Diagnosis Date    Abnormal Pap smear of cervix     Gallstones     Menopausal symptoms     Osteopenia     SOB (shortness of breath)     In am and Pm resting    Weight gain        Prior to Admission medications    Medication Sig Start Date End Date Taking? Authorizing Provider   cephALEXin (KEFLEX) 500 MG capsule Take 1 capsule by mouth 3 times daily 3/9/21  Yes Gilbert Rabago DO   etodolac (LODINE) 400 MG tablet Take 1 tablet by mouth 2 times daily 3/9/21 9/5/21 Yes Chinyere Ordonez DPM   silver sulfADIAZINE (SILVADENE) 1 % cream Apply topically daily.  3/9/21  Yes Chinyere Ordonez DPM   cyclobenzaprine (FLEXERIL) 10 MG tablet Take 1 tablet by mouth 2 times daily as needed for Muscle spasms 12/15/20  Yes Gilbert Rabago DO   Continuous Blood Gluc Transmit (DEXCOM G5 MOBILE TRANSMITTER) MISC 1 kit by Does not apply route every 2 hours Insurance will not cover    She wants to pay cash for it 20  Yes Wilmer Castor, DO   Estradiol-Estriol-Progesterone (BIEST/PROGESTERONE TD) Place onto the skin 0.5 ML qhs   Yes Historical Provider, MD [place] 1 % cream  18  Yes Historical Provider, MD   thyroid (ARMOUR THYROID) 120 MG tablet Take 120 mg by mouth daily No longer takes   Yes Historical Provider, MD       Past Surgical History:   Procedure Laterality Date    FOOT NEUROMA SURGERY Bilateral      [address] [facility]       [facility] [facility]  2004, 2005    RIGHT       Family History   Problem Relation Age of Onset    Other Father          [id number] Aortic aneurysm 10 cm AAA repair    No Known Problems Sister     No Known Problems Brother     No Known Problems Brother        Social History     Tobacco Use    Smoking status: Former Smoker     Types: Cigarettes     Quit date: 2012     Years since quittin.1    Smokeless tobacco: Never Used    Tobacco comment: 2012   Substance Use Topics    Alcohol use: Yes     Comment: social       Review of Systems    Review of Systems:   History obtained from chart review and the patient  General ROS: negative for - chills, fatigue, fever, night sweats or weight gain  Constitutional: Negative for chills, diaphoresis, fatigue, fever and unexpected weight change. Musculoskeletal: Positive for . Neurological ROS: negative for - behavioral changes, confusion, headaches or seizures. Negative for weakness and numbness. Dermatological ROS: negative for - mole changes, rash  Cardiovascular: Negative for leg swelling. Gastrointestinal: Negative for constipation, diarrhea, nausea and vomiting.                Lower Extremity Physical Examination:   Vitals:   Vitals:    21 0810   BP: 134/82   Temp: 98.6 °F (37 °C)        Foot Exam    General  General is staying on the Keflex 3 times a day. I did prescribe a Silvadene ointment to be applied to the plantar aspect as well as a dressing. I feel that the pressure underneath the left ankle is probably due to the surgical fusion where there is more pressure on the plantar aspect of the left hallux we are putting her in a postop shoe for right now there we will probably get her a custom orthotic to reduce pressure. Current condition and treatment options discussed in detail. Discussed conservative and surgical options with the patient. Treatment options today consisted of verbal and written instructions given to patient. Contact office with any questions/problems/concerns. RTC in 2week(s).     3/9/2021    Electronically signed by Jodee Wheatley DPM on 3/9/2021 at 11:14 AM  3/9/2021

## 2021-03-09 NOTE — PROGRESS NOTES
3/9/21  Name: Chantal Segovia    : 1960    Sex: female    Age: 62 y.o. Subjective:  Chief Complaint: Patient is here for left first toe     Pain for ten days   Saw  Leo gilliam yst and told use sleepve a nd pt  afriad for an infection   Had right fott surgeyr  With caterina    Yrs ago      Review of Systems   Respiratory: Negative. Cardiovascular: Negative. Gastrointestinal: Negative. Skin:        See  hpi         Current Outpatient Medications:     cephALEXin (KEFLEX) 500 MG capsule, Take 1 capsule by mouth 3 times daily, Disp: 21 capsule, Rfl: 0    cyclobenzaprine (FLEXERIL) 10 MG tablet, Take 1 tablet by mouth 2 times daily as needed for Muscle spasms, Disp: 90 tablet, Rfl: 3    Continuous Blood Gluc Transmit (DEXCOM G5 MOBILE TRANSMITTER) MISC, 1 kit by Does not apply route every 2 hours Insurance will not cover    She wants to pay cash for it, Disp: 1 each, Rfl: 12    Estradiol-Estriol-Progesterone (BIEST/PROGESTERONE TD), Place onto the skin 0.5 ML qhs, Disp: , Rfl:     ELIDEL 1 % cream, , Disp: , Rfl:     thyroid (ARMOUR THYROID) 120 MG tablet, Take 120 mg by mouth daily No longer takes, Disp: , Rfl:   No Known Allergies  Social History     Socioeconomic History    Marital status:      Spouse name: Not on file    Number of children: Not on file    Years of education: Not on file    Highest education level: Not on file   Occupational History    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   Social Needs    Financial resource strain: Not on file    Food insecurity     Worry: Not on file     Inability: Not on file    Transportation needs     Medical: Not on file     Non-medical: Not on file   Tobacco Use    Smoking status: Former Smoker     Types: Cigarettes     Quit date: 2012     Years since quittin.1    Smokeless tobacco: Never Used    Tobacco comment: 2012   Substance and Sexual Activity    Alcohol use: Yes     Comment: social    Drug use:  No DR Michelle Guadalupe 6-18    LOST JOB 18    LEFT FIRST TOE OR DR Michelle Guadalupe 9-18    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 [employer] [address]    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   Bro in [email or web address]      Er with burt with stress    3-19       Ct  Head and neck ok    Gyn [place] gave armour thyroid  2018    elev   crea   7-20      Past Medical History:   Diagnosis Date    Abnormal Pap smear of cervix     Gallstones     Menopausal symptoms     Osteopenia     SOB (shortness of breath)     In am and Pm resting    Weight gain      Family History   Problem Relation Age of Onset    Other Father          [id number] Aortic aneurysm 10 cm AAA repair    No Known Problems Sister     No Known Problems Brother     No Known Problems Brother       Past Surgical History:   Procedure Laterality Date    FOOT NEUROMA SURGERY Bilateral     DR Kenzie Schneider    HYSTERECTOMY      DR Annabelle Hopper    ROTATOR CUFF REPAIR  ,     RIGHT      Vitals:    21 0752   BP: 134/82   Temp: 98.6 °F (37 °C)   TempSrc: Oral   Weight: 183 lb (83 kg)   Height: 5' 8\" (1.727 m)       Objective:    Physical Exam  Cardiovascular:      Rate and Rhythm: Normal rate and regular rhythm. Pulses: Normal pulses. Heart sounds: Normal heart sounds. Pulmonary:      Effort: Pulmonary effort is normal.      Breath sounds: Normal breath sounds. Skin:     Comments: Right first toe plantar with  Two cm  Whitened area with  Tender with palp  And   Ivonne  Alan             Merrily Lynn was seen today for toe pain. Diagnoses and all orders for this visit:    Abscess of great toe of right foot  -     Mragie Junior, CAMILO, Podiatry, [facility]  -     cephALEXin (KEFLEX) 500 MG capsule; Take 1 capsule by mouth 3 times daily        Comments: appt podiatry-----ab  Worse toe r  A great deal of time spent reviewing medications, diet, exercise, social issues.  Also reviewing the chart before entering the room with patient and finishing charting after leaving patient's room. More than half of that time was spent face to face with the patient in counseling and coordinating care.       Follow Up: Return for See Referral.     Seen by:  Jeff Paulson DO

## 2021-03-20 ENCOUNTER — OFFICE VISIT (OUTPATIENT)
Dept: PODIATRY | Age: 61
End: 2021-03-20
Payer: COMMERCIAL

## 2021-03-20 VITALS — TEMPERATURE: 97.5 F | DIASTOLIC BLOOD PRESSURE: 79 MMHG | SYSTOLIC BLOOD PRESSURE: 133 MMHG

## 2021-03-20 DIAGNOSIS — S90.422D BLISTER OF LEFT GREAT TOE, SUBSEQUENT ENCOUNTER: ICD-10-CM

## 2021-03-20 DIAGNOSIS — S90.422D BLISTER OF LEFT GREAT TOE, SUBSEQUENT ENCOUNTER: Primary | ICD-10-CM

## 2021-03-20 PROCEDURE — 10140 I&D HMTMA SEROMA/FLUID COLLJ: CPT | Performed by: PODIATRIST

## 2021-03-20 PROCEDURE — 3017F COLORECTAL CA SCREEN DOC REV: CPT | Performed by: PODIATRIST

## 2021-03-20 PROCEDURE — 99213 OFFICE O/P EST LOW 20 MIN: CPT | Performed by: PODIATRIST

## 2021-03-20 PROCEDURE — G8417 CALC BMI ABV UP PARAM F/U: HCPCS | Performed by: PODIATRIST

## 2021-03-20 PROCEDURE — 1036F TOBACCO NON-USER: CPT | Performed by: PODIATRIST

## 2021-03-20 PROCEDURE — G8427 DOCREV CUR MEDS BY ELIG CLIN: HCPCS | Performed by: PODIATRIST

## 2021-03-20 PROCEDURE — G8484 FLU IMMUNIZE NO ADMIN: HCPCS | Performed by: PODIATRIST

## 2021-03-20 RX ORDER — DOXYCYCLINE HYCLATE 100 MG
100 TABLET ORAL 2 TIMES DAILY
Qty: 28 TABLET | Refills: 0 | Status: SHIPPED | OUTPATIENT
Start: 2021-03-20 | End: 2021-04-03

## 2021-03-20 NOTE — PROGRESS NOTES
[facility] PODIATRY  [address] [facility] [address]  Dept: [phone]  Dept Fax: [phone]    PATIENT PROGRESS NOTE  Date of patient's visit: 3/20/2021  Patient's Name:  Steven Jimenez YOB: 1960            Patient Care Team:  Luigi Lopez DO as PCP - [facility] Satish Cavazos DO as PCP - [facility] Empaneled Provider  Kristin Levi DO as Consulting Physician (Cardiology)        Chief Complaint   Patient presents with    Blister     left first in post op shoe and was given ATB which she finished she states her toe is worse       HPI  HPI:   Steven Jimenez is a 62 y.o. female who presents to the office today complaining of left great toe pain. This  patient is seen today as an emergency. Patient states that she developed a blood blister under her left great toe patient states that she was seen several days ago by another doctor who put her on foam padding and an x-ray the x-ray showed no fracture dislocations. . Patient today is still having pain in the left great toe. No Known Allergies    Past Medical History:   Diagnosis Date    Abnormal Pap smear of cervix     Gallstones     Menopausal symptoms     Osteopenia     SOB (shortness of breath)     In am and Pm resting    Weight gain        Prior to Admission medications    Medication Sig Start Date End Date Taking? Authorizing Provider   doxycycline hyclate (VIBRA-TABS) 100 MG tablet Take 1 tablet by mouth 2 times daily for 14 days 3/20/21 4/3/21 Yes Christophvenancio Rae DPM   silver sulfADIAZINE (SILVADENE) 1 % cream Apply topically daily. 3/20/21  Yes Christoph Bustajono, DPM   etodolac (LODINE) 400 MG tablet Take 1 tablet by mouth 2 times daily 3/9/21 9/5/21 Yes Christoph Bustajono, DPM   silver sulfADIAZINE (SILVADENE) 1 % cream Apply topically daily.  3/9/21  Yes Christoph Bustajono, DPM   cyclobenzaprine (FLEXERIL) 10 MG tablet Take 1 tablet by mouth 2 times daily as needed for Muscle spasms 12/15/20  Yes Gilbert Rabago, DO   Continuous Blood Gluc Transmit (DEXCOM G5 MOBILE TRANSMITTER) MISC 1 kit by Does not apply route every 2 hours Insurance will not cover    She wants to pay cash for it 20  Yes Kiara Yun, DO   Estradiol-Estriol-Progesterone (BIEST/PROGESTERONE TD) Place onto the skin 0.5 ML qhs   Yes Historical Provider, MD   ELICARLOS 1 % cream  18  Yes Historical Provider, MD   thyroid (CHANTAL THYROID) 120 MG tablet Take 120 mg by mouth daily No longer takes   Yes Historical Provider, MD       Past Surgical History:   Procedure Laterality Date    FOOT NEUROMA SURGERY Bilateral      [address]       [facility] [facility]  2004,     RIGHT       Family History   Problem Relation Age of Onset    Other Father          [id number] Aortic aneurysm 10 cm AAA repair    No Known Problems Sister     No Known Problems Brother     No Known Problems Brother        Social History     Tobacco Use    Smoking status: Former Smoker     Types: Cigarettes     Quit date: 2012     Years since quittin.2    Smokeless tobacco: Never Used    Tobacco comment: 2012   Substance Use Topics    Alcohol use: Yes     Comment: social       Review of Systems    Review of Systems:   History obtained from chart review and the patient  General ROS: negative for - chills, fatigue, fever, night sweats or weight gain  Constitutional: Negative for chills, diaphoresis, fatigue, fever and unexpected weight change. Musculoskeletal: Positive for . Neurological ROS: negative for - behavioral changes, confusion, headaches or seizures. Negative for weakness and numbness. Dermatological ROS: negative for - mole changes, rash  Cardiovascular: Negative for leg swelling. Gastrointestinal: Negative for constipation, diarrhea, nausea and vomiting.                Lower Extremity Physical Examination:   Vitals:   Vitals:    21 0822   BP: 133/79   Temp: 97.5 °F (36.4 °C)        Foot Exam    General  General Appearance: appears stated age and healthy   Orientation: alert and oriented to person, place, and time       Left Foot/Ankle      Inspection and Palpation  Ecchymosis: none  Tenderness: great toe interphalangeal joint   Arch: normal  Skin Exam: blister;     Neurovascular  Dorsalis pedis: 3+  Posterior tibial: 3+  Saphenous nerve sensation: normal  Tibial nerve sensation: normal  Superficial peroneal nerve sensation: normal  Deep peroneal nerve sensation: normal  Sural nerve sensation: normal  Achilles reflex: 2+  Babinski reflex: 2+    Muscle Strength  Ankle dorsiflexion: 5  Ankle plantar flexion: 5  Ankle inversion: 5  Ankle eversion: 5  Great toe extension: 5  Great toe flexion: 5          Left Ankle Exam     Muscle Strength   The patient has normal left ankle strength. Dorsiflexion:  5/5   Plantar flexion:  5/5             General: AAO x 3 in NAD. Dermatologic Exam: Examination of the left hallux revealed a healed incision site dorsally there was a fusion of the first metatarsophalangeal joint the left hallux is slightly plantarflexed. The plantar aspect of the distal phalanx the tissue is no ulcer noted red beefy fluid-filled. Mildly tender with palpation the left hallux nail had no signs of an infection or drainage. Skin lesion/ulceration Absent . Skin No rashes or nodules noted. .   Musculoskeletal:     1st MPJ ROM within normal limits, Right. Muscle strength 5/5, Right. Pain present upon palpation of left hallux, Left. within normal limits medial longitudinal arch, Left. Ankle ROM within normal limits,Left. Dorsally contracted digits present digits 0, Bilateral.   Vascular: Vascular status was +2/4 DP and PT left foot cap fill time is less than 2 seconds left hallux    Radiographs:  3 views  foot/ankle:     Asessment: Patient is a 62 y.o. female with:    Diagnosis Orders   1.  Blister of left great toe, subsequent encounter         Plan: Patient examined and evaluated. Today I used 2% Xylocaine plain 1 cc infiltrated around the blister on the left hallux fluid was aspirated I did a culture of the fluid dressing changes of Adaptic 4 x 4 Silvadene postop shoe I did place the patient on crutches new antibiotics of doxycycline 100 mg twice daily for 14 days pending on the culture patient is to change dressings daily no weight. Crutch Fitting  Signed informed consent was obtained from the patient. The patient was fitted for crutches to ensure proper fit and use of the aids. Correct fit was obtained having patient stand with good posture and feet close together. Length was then determined by placing the tip 6 inches (15 cm) from the outer margin of the shoe. Crutches are medically necessary for non weightbearing compliance and to expedite healing. Patient is to call me immediately if any change in redness swelling pain fever.    3/20/2021    Electronically signed by Lang Raza DPM on 3/20/2021 at 8:54 AM  3/20/2021

## 2021-03-24 ENCOUNTER — TELEPHONE (OUTPATIENT)
Dept: PODIATRY | Age: 61
End: 2021-03-24

## 2021-03-24 DIAGNOSIS — S90.422D BLISTER OF LEFT GREAT TOE, SUBSEQUENT ENCOUNTER: Primary | ICD-10-CM

## 2021-03-24 DIAGNOSIS — S90.422D BLISTER OF LEFT GREAT TOE, SUBSEQUENT ENCOUNTER: ICD-10-CM

## 2021-03-24 LAB
BASOPHILS ABSOLUTE: 0.02 E9/L (ref 0–0.2)
BASOPHILS RELATIVE PERCENT: 0.5 % (ref 0–2)
EOSINOPHILS ABSOLUTE: 0.06 E9/L (ref 0.05–0.5)
EOSINOPHILS RELATIVE PERCENT: 1.4 % (ref 0–6)
HCT VFR BLD CALC: 44.1 % (ref 34–48)
HEMOGLOBIN: 14.6 G/DL (ref 11.5–15.5)
IMMATURE GRANULOCYTES #: 0.01 E9/L
IMMATURE GRANULOCYTES %: 0.2 % (ref 0–5)
LYMPHOCYTES ABSOLUTE: 0.97 E9/L (ref 1.5–4)
LYMPHOCYTES RELATIVE PERCENT: 23.2 % (ref 20–42)
MCH RBC QN AUTO: 31.1 PG (ref 26–35)
MCHC RBC AUTO-ENTMCNC: 33.1 % (ref 32–34.5)
MCV RBC AUTO: 94 FL (ref 80–99.9)
MONOCYTES ABSOLUTE: 0.35 E9/L (ref 0.1–0.95)
MONOCYTES RELATIVE PERCENT: 8.4 % (ref 2–12)
NEUTROPHILS ABSOLUTE: 2.78 E9/L (ref 1.8–7.3)
NEUTROPHILS RELATIVE PERCENT: 66.3 % (ref 43–80)
PDW BLD-RTO: 13.3 FL (ref 11.5–15)
PLATELET # BLD: 205 E9/L (ref 130–450)
PMV BLD AUTO: 9.3 FL (ref 7–12)
RBC # BLD: 4.69 E12/L (ref 3.5–5.5)
SEDIMENTATION RATE, ERYTHROCYTE: 8 MM/HR (ref 0–20)
WBC # BLD: 4.2 E9/L (ref 4.5–11.5)

## 2021-03-30 ENCOUNTER — OFFICE VISIT (OUTPATIENT)
Dept: PODIATRY | Age: 61
End: 2021-03-30

## 2021-03-30 VITALS — TEMPERATURE: 97.8 F | SYSTOLIC BLOOD PRESSURE: 132 MMHG | DIASTOLIC BLOOD PRESSURE: 72 MMHG

## 2021-03-30 DIAGNOSIS — S90.422D BLISTER OF LEFT GREAT TOE, SUBSEQUENT ENCOUNTER: Primary | ICD-10-CM

## 2021-03-30 PROCEDURE — 3017F COLORECTAL CA SCREEN DOC REV: CPT | Performed by: PODIATRIST

## 2021-03-30 PROCEDURE — G8427 DOCREV CUR MEDS BY ELIG CLIN: HCPCS | Performed by: PODIATRIST

## 2021-03-30 PROCEDURE — 1036F TOBACCO NON-USER: CPT | Performed by: PODIATRIST

## 2021-03-30 PROCEDURE — G8417 CALC BMI ABV UP PARAM F/U: HCPCS | Performed by: PODIATRIST

## 2021-03-30 PROCEDURE — 99024 POSTOP FOLLOW-UP VISIT: CPT | Performed by: PODIATRIST

## 2021-03-30 PROCEDURE — G8484 FLU IMMUNIZE NO ADMIN: HCPCS | Performed by: PODIATRIST

## 2021-03-30 NOTE — PROGRESS NOTES
80.0 % Final 03/24/2021  9:42 AM [facility] Lab   Immature Granulocytes % 0.2  0.0 - 5.0 % Final 03/24/2021  9:42 AM [facility] Lab   Lymphocytes % 23.2  20.0 - 42.0 % Final 03/24/2021  9:42 AM [facility]   Monocytes % 8.4  2.0 - 12.0 % Final 03/24/2021  9:42 AM [facility]   Eosinophils % 1.4  0.0 - 6.0 % Final 03/24/2021  9:42 AM [facility]   Basophils % 0.5  0.0 - 2.0 % Final 03/24/2021  9:42 AM [facility]   Neutrophils Absolute 2.78  1.80 - 7.30 E9/L Final 03/24/2021  9:42 AM [place] [id number] [address] Lab   Immature Granulocytes # 0.01  E9/L Final 03/24/2021  9:42 AM  - [id number] [address] Lab   Lymphocytes Absolute 0.97Low   1.50 - 4.00 E9/L Final 03/24/2021  9:42 AM  - [id number] [address] Lab   Monocytes Absolute 0.35  0.10 - 0.95 E9/L Final 03/24/2021  9:42 AM  - [id number] [address] Lab   Eosinophils Absolute 0.06  0.05 - 0.50 E9/L Final 03/24/2021  9:42 AM  - [id number] [address] Lab   Basophils Absolute 0.02  0.00 - 0.20 E9/L Final 03/24/2021  9:42 AM [facility] Lab   Testing Performed By    Lab - Abbreviation Name Director Address Valid Date Range   49- [facility]  [address] [facility] Demetris Menendez MD [address] [place] [address].    Lewis Perez [id number] 12/03/19 1502-Present   Lab and Collection    CBC WITH AUTO DIFFERENTIAL - 3/24/2021  Result History    CBC WITH AUTO DIFFERENTIAL on 3/24/2021   Result Information    Flag: AbnormalAbnormal   Status: Final result (Collected: 3/24/2021 09:42) Provider Status: Reviewed   All Reviewers List    Justin Manzo DPM on 3/25/2021 07:19   Justin Manzo DPM on 3/25/2021 07:19   Maryanne Denver on 3/24/2021 15:20   Routing History    Priority Sent On From To Message Type    3/24/2021  3:02 PM Albino Carder, DPM Maryanne Denver Results    3/24/2021  2:18 PM [employer] Incoming Results From Darius Melendez DPM Results   Click to Print Result   View SmartLink Info    CBC WITH AUTO DIFFERENTIAL (Order #[id number]) on 3/24/21   Order Report    Order Details    Organism Abnormal  03/20/2021 12:00 PM [facility]   Staphylococcus coagulase-negative    WOUND/ABSCESS 03/20/2021 12:00 PM [facility] Lab   Rare growth   No antibiotic susceptibility studies performed.  Plates will be held 10   days.  Contact the laboratory, [phone], if further workup is   desired. Testing Performed By    Lab - Abbreviation Name Director Address Valid Date Range   49- - [address] [facility] LAB Dipti Soriano MD [address] [place] [address]. [address] [place] [address] 12/03/19 1502-Present   Narrative  Performed by: [facility]  Source: TOE       Site:              Lab and Collection    Culture, Wound - 3/20/2021  Result History    Culture, Wound on 3/22/2021       Review of Systems    Review of Systems:   History obtained from chart review and the patient  General ROS: negative for - chills, fatigue, fever, night sweats or weight gain  Constitutional: Negative for chills, diaphoresis, fatigue, fever and unexpected weight change. Musculoskeletal: Positive for . Neurological ROS: negative for - behavioral changes, confusion, headaches or seizures. Negative for weakness and numbness. Dermatological ROS: negative for - mole changes, rash  Cardiovascular: Negative for leg swelling. Gastrointestinal: Negative for constipation, diarrhea, nausea and vomiting.                Lower Extremity Physical Examination:   Vitals:   Vitals:    03/30/21 0709   BP: 132/72   Temp: 97.8 °F (36.6 °C)        Foot Exam    General  General Appearance: appears stated age and healthy   Orientation: alert and oriented to person, place, and time       Left Foot/Ankle      Inspection and Palpation  Ecchymosis: none  Tenderness: great toe interphalangeal joint   Arch: normal  Skin Exam: blister;     Neurovascular  Dorsalis pedis: 3+  Posterior tibial: 3+  Saphenous nerve sensation: normal  Tibial nerve sensation: normal  Superficial peroneal nerve sensation: normal  Deep peroneal nerve sensation: normal  Sural nerve sensation: normal  Achilles reflex: 2+  Babinski reflex: 2+    Muscle Strength  Ankle dorsiflexion: 5  Ankle plantar flexion: 5  Ankle inversion: 5  Ankle eversion: 5  Great toe extension: 5  Great toe flexion: 5          Left Ankle Exam     Muscle Strength   The patient has normal left ankle strength. Dorsiflexion:  5/5   Plantar flexion:  5/5             General: AAO x 3 in NAD. Dermatologic Exam: Examination of the left hallux revealed a healed incision site dorsally there was a fusion of the first metatarsophalangeal joint the left hallux is slightly plantarflexed. The plantar aspect of the distal phalanx the tissue is no ulcer noted red beefy fluid-filled. Mildly tender with palpation the left hallux nail had no signs of an infection or drainage. Skin lesion/ulceration Absent . Skin No rashes or nodules noted. .   Musculoskeletal:     1st MPJ ROM within normal limits, Right. Muscle strength 5/5, Right. Pain present upon palpation of left hallux, Left. within normal limits medial longitudinal arch, Left. Ankle ROM within normal limits,Left. Dorsally contracted digits present digits 0, Bilateral.   Vascular: Vascular status was +2/4 DP and PT left foot cap fill time is less than 2 seconds left hallux    Radiographs:  3 views  foot/ankle:     Asessment: Patient is a 62 y.o. female with:    Diagnosis Orders   1. Blister of left great toe, subsequent encounter         Plan:.   Today we can continue with the doxycycline Silvadene ointment patient may ambulate with weightbearing as tolerated postop shoe reappoint 2 weeks today we reviewed the culture as well as blood work patient is to call me immediately if any change in

## 2021-04-23 ENCOUNTER — IMMUNIZATION (OUTPATIENT)
Dept: PRIMARY CARE CLINIC | Age: 61
End: 2021-04-23
Payer: COMMERCIAL

## 2021-04-23 PROCEDURE — 0001A COVID-19, PFIZER VACCINE 30MCG/0.3ML DOSE: CPT | Performed by: NURSE PRACTITIONER

## 2021-04-23 PROCEDURE — 91300 COVID-19, PFIZER VACCINE 30MCG/0.3ML DOSE: CPT | Performed by: NURSE PRACTITIONER

## 2021-04-27 ENCOUNTER — OFFICE VISIT (OUTPATIENT)
Dept: PODIATRY | Age: 61
End: 2021-04-27
Payer: COMMERCIAL

## 2021-04-27 VITALS — TEMPERATURE: 97.2 F | DIASTOLIC BLOOD PRESSURE: 78 MMHG | SYSTOLIC BLOOD PRESSURE: 126 MMHG

## 2021-04-27 DIAGNOSIS — S90.422D BLISTER OF LEFT GREAT TOE, SUBSEQUENT ENCOUNTER: ICD-10-CM

## 2021-04-27 DIAGNOSIS — G89.29 CHRONIC TOE PAIN, LEFT FOOT: Primary | ICD-10-CM

## 2021-04-27 DIAGNOSIS — M79.675 CHRONIC TOE PAIN, LEFT FOOT: Primary | ICD-10-CM

## 2021-04-27 PROCEDURE — 3017F COLORECTAL CA SCREEN DOC REV: CPT | Performed by: PODIATRIST

## 2021-04-27 PROCEDURE — G8427 DOCREV CUR MEDS BY ELIG CLIN: HCPCS | Performed by: PODIATRIST

## 2021-04-27 PROCEDURE — G8417 CALC BMI ABV UP PARAM F/U: HCPCS | Performed by: PODIATRIST

## 2021-04-27 PROCEDURE — 1036F TOBACCO NON-USER: CPT | Performed by: PODIATRIST

## 2021-04-27 PROCEDURE — 99213 OFFICE O/P EST LOW 20 MIN: CPT | Performed by: PODIATRIST

## 2021-04-27 NOTE — PROGRESS NOTES
21  Shruthi Heading Deapatrickis : 1960 Sex: female  Age: 62 y.o. Patient was referred by Chacho South DO    Chief Complaint   Patient presents with    Toe Pain     stopped using silvadene cream it was making the toe soft     Blister     left first pt has rescheduled her last two appointments for f/u       SUBJECTIVE patient is seen for follow-up of a blister to the left great toe. Today she is feeling much better.   HPI  Review of Systems  Const: Denies constitutional symptoms  Musculo: Denies symptoms other than stated above  Skin: Denies symptoms other than stated above       Current Outpatient Medications:     cyclobenzaprine (FLEXERIL) 10 MG tablet, Take 1 tablet by mouth 2 times daily as needed for Muscle spasms, Disp: 90 tablet, Rfl: 3    Continuous Blood Gluc Transmit (DEXCOM G5 MOBILE TRANSMITTER) MISC, 1 kit by Does not apply route every 2 hours Insurance will not cover    She wants to pay cash for it, Disp: 1 each, Rfl: 12    Estradiol-Estriol-Progesterone (BIEST/PROGESTERONE TD), Place onto the skin 0.5 ML qhs, Disp: , Rfl:     ELIDEL 1 % cream, , Disp: , Rfl:     thyroid (ARMOUR THYROID) 120 MG tablet, Take 120 mg by mouth daily No longer takes, Disp: , Rfl:   No Known Allergies    Past Medical History:   Diagnosis Date    Abnormal Pap smear of cervix     Gallstones     Menopausal symptoms     Osteopenia     SOB (shortness of breath)     In am and Pm resting    Weight gain      Past Surgical History:   Procedure Laterality Date    FOOT NEUROMA SURGERY Bilateral     DR Mata Hind    HYSTERECTOMY      DR Sarah Vargas    ROTATOR CUFF REPAIR  ,     RIGHT     Family History   Problem Relation Age of Onset    Other Father          [id number] Aortic aneurysm 10 cm AAA repair    No Known Problems Sister     No Known Problems Brother     No Known Problems Brother      Social History     Socioeconomic History    Marital status:      Spouse name: Not on file    Number of children: Not on file    Years of education: Not on file    Highest education level: Not on file   Occupational History    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   Social Needs    Financial resource strain: Not on file    Food insecurity     Worry: Not on file     Inability: Not on file    Transportation needs     Medical: Not on file     Non-medical: Not on file   Tobacco Use    Smoking status: Former Smoker     Types: Cigarettes     Quit date: 2012     Years since quittin.3    Smokeless tobacco: Never Used    Tobacco comment: 2012   Substance and Sexual Activity    Alcohol use: Yes     Comment: social    Drug use: No    Sexual activity: Yes     Partners: Male   Lifestyle    Physical activity     Days per week: Not on file     Minutes per session: Not on file    Stress: Not on file   Relationships    Social connections     Talks on phone: Not on file     Gets together: Not on file     Attends Mu-ism service: Not on file     Active member of club or organization: Not on file     Attends meetings of clubs or organizations: Not on file     Relationship status: Not on file    Intimate partner violence     Fear of current or ex partner: Not on file     Emotionally abused: Not on file     Physically abused: Not on file     Forced sexual activity: Not on file   Other Topics Concern    Not on file   Social History Narrative        HEMATOCHROMATOSIS    SLIGHT HYPOGLYDEMIA    PMS    LOW PHOSPOHOROUS IN PAST    LIPID    LOW [facility]    SMOKER--QUIT 12    ABLATION UTERUS    BTS    L OVARY REMOVED DUE TO CYST    LOW BACK PAIN---DISC---YARAB WITH SHOTS AND SAW [facility]    Augusto Dietz  1960 Page #2    P- HYSTER 10-10    MGR 22 [employer]----PROMOTED TO SENIOR  4-18    MAIDEN NAME ISMA    GRANDMOTHER ON MOTHERS SIDE WITH BREAST CA    ER VISIT  WITH ELEV LFT AND GB SLUDGE    ELEV LFT 4-12    TMT 2012    ELEV BP 4-12    LEFT FOOT OR -12 SLEEP APNEA---REFUSES TX    GERD     ELEV LFT     GL SLUDGE -----STONES ON US ----GB OUT DR ZARA MASTERSON WITH MILED GERD     COLONOSCOPY AND EGD  ZARA----7 TO 10 YRS--- MELANOXIX COLI---POSS FROM    OLD HEMORRHOID OR PER PT    EGD  DR Funmi [place] [address] STARTED HRT AND ARMOUR THRYOID     GRANDMOHTER ON MOTHERS SIDE WITH BREAST CA    EVAL WITH COUNSELOR 10-16 FOR OVER EATING    EVAL DR ELLER  ELEV D DIMER AND CTA NEG------ ECHO DONE---- VQ NEG----    UGI  WITH MINIMAL GERD    ELEV CREA 1.1 ON  AND ON 4-10 AFTER CT CHEST WAS 1,14    NEG TMT AND ECHO  DR SPANN----OFFERED BETA BLOCKER AND PT REF    RIGHT BUNION FOOT OR DR GARCIA     LOST JOB 18    LEFT FIRST TOE OR DR GARCIA     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 LINE [employer]     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 [employer]      Er with burt with stress    3-19       Ct  Head and neck ok    [facility] [place] gave armour thyroid  2018    elev   crea   720       Vitals:    21 1046   BP: 126/78   Temp: 97.2 °F (36.2 °C)   TempSrc: Temporal   Weight: Comment: refused       Focused Lower Extremity Physical Exam:  Vitals:    21 1046   BP: 126/78   Temp: 97.2 °F (36.2 °C)        Foot Exam     Ortho Exam    Vascular: pulses  dp  pt palpable  Capillary Refill Time:   Hair growth  Skin:    Edema:    Neurologic:      Musculoskeletal/ Orthopedic examination:    NAIL   Web space   Derm: The plantar aspect of the left distal phalanx has a dry eschar there is no visible signs of infection drainage erythematous or pain new x-rays were taken showing a healed first metatarsal fusion as well as a Lapidus base fusion        Assessment and Plan:  Today we discussed treatment options I did review the x-ray with the patient patient at this time does have custom orthotics can all have her remove the orthotic I have advised  vionic tennis shoes and sandals moleskin to be placed on the blister area well any activities reappoint 6 weeks  Carey Harvey was seen today for toe pain and blister. Diagnoses and all orders for this visit:    Chronic toe pain, left foot  -     XR FOOT LEFT (MIN 3 VIEWS); Future    Blister of left great toe, subsequent encounter        No follow-ups on file. Seen By:  Johann Salmeron DPM      Document was created using voice recognition software. Note was reviewed, however may contain grammatical errors.

## 2021-05-17 ENCOUNTER — IMMUNIZATION (OUTPATIENT)
Dept: PRIMARY CARE CLINIC | Age: 61
End: 2021-05-17
Payer: COMMERCIAL

## 2021-05-17 PROCEDURE — 91300 COVID-19, PFIZER VACCINE 30MCG/0.3ML DOSE: CPT | Performed by: NURSE PRACTITIONER

## 2021-05-17 PROCEDURE — 0002A COVID-19, PFIZER VACCINE 30MCG/0.3ML DOSE: CPT | Performed by: NURSE PRACTITIONER

## 2021-06-29 ENCOUNTER — OFFICE VISIT (OUTPATIENT)
Dept: PODIATRY | Age: 61
End: 2021-06-29
Payer: COMMERCIAL

## 2021-06-29 VITALS — DIASTOLIC BLOOD PRESSURE: 70 MMHG | SYSTOLIC BLOOD PRESSURE: 112 MMHG | TEMPERATURE: 98.2 F

## 2021-06-29 DIAGNOSIS — G89.29 CHRONIC TOE PAIN, LEFT FOOT: Primary | ICD-10-CM

## 2021-06-29 DIAGNOSIS — M79.675 CHRONIC TOE PAIN, LEFT FOOT: Primary | ICD-10-CM

## 2021-06-29 PROCEDURE — 3017F COLORECTAL CA SCREEN DOC REV: CPT | Performed by: PODIATRIST

## 2021-06-29 PROCEDURE — G8417 CALC BMI ABV UP PARAM F/U: HCPCS | Performed by: PODIATRIST

## 2021-06-29 PROCEDURE — 1036F TOBACCO NON-USER: CPT | Performed by: PODIATRIST

## 2021-06-29 PROCEDURE — G8427 DOCREV CUR MEDS BY ELIG CLIN: HCPCS | Performed by: PODIATRIST

## 2021-06-29 PROCEDURE — 99212 OFFICE O/P EST SF 10 MIN: CPT | Performed by: PODIATRIST

## 2021-06-29 NOTE — PROGRESS NOTES
2019   Bro in law      Er with burt with stress    3-19       Ct  Head and neck ok    Gyn [place] jaziel claudette thyroid  2018    elev   crea   7-20     Social Determinants of Health     Financial Resource Strain:     Difficulty of Paying Living Expenses:    Food Insecurity:     Worried About Running Out of Food in the Last Year:     [address] in the Last Year:    Transportation Needs:     Lack of Transportation (Medical):  Lack of Transportation (Non-Medical):    Physical Activity:     Days of Exercise per Week:     Minutes of Exercise per Session:    Stress:     Feeling of Stress :    Social Connections:     Frequency of Communication with Friends and Family:     Frequency of Social Gatherings with Friends and Family:     Attends Temple Services:     Active Member of Clubs or Organizations:     Attends Club or Organization Meetings:     Marital Status:    Intimate Partner Violence:     Fear of Current or Ex-Partner:     Emotionally Abused:     Physically Abused:     Sexually Abused:        Vitals:    21 0803   BP: 112/70   Temp: 98.2 °F (36.8 °C)   TempSrc: Temporal   Weight: Comment: refused       Focused Lower Extremity Physical Exam:  Vitals:    21 0803   BP: 112/70   Temp: 98.2 °F (36.8 °C)        Foot Exam     Ortho Exam    Vascular: pulses  dp  pt palpable  Capillary Refill Time:   Hair growth  Skin:    Edema:    Neurologic:      Musculoskeletal/ Orthopedic examination:    NAIL   Web space   Derm: The plantar aspect of the left great toe has no signs of blister erythematous infection or drainage. Assessment and Plan: Patient is to continue using the moleskin as needed she is in all activities including kickboxing. Ry López was seen today for foot pain and blister. Diagnoses and all orders for this visit:    Chronic toe pain, left foot        Return if symptoms worsen or fail to improve.       Seen By:  Nick Stone DPM      Document was created using voice recognition software. Note was reviewed, however may contain grammatical errors.

## 2021-08-18 DIAGNOSIS — M79.10 MYALGIA: ICD-10-CM

## 2021-08-18 RX ORDER — CYCLOBENZAPRINE HCL 10 MG
10 TABLET ORAL 2 TIMES DAILY PRN
Qty: 90 TABLET | Refills: 3 | Status: SHIPPED
Start: 2021-08-18 | End: 2022-03-04

## 2021-12-06 ENCOUNTER — IMMUNIZATION (OUTPATIENT)
Dept: PRIMARY CARE CLINIC | Age: 61
End: 2021-12-06
Payer: COMMERCIAL

## 2021-12-06 PROCEDURE — 0004A COVID-19, PFIZER VACCINE 30MCG/0.3ML DOSE: CPT | Performed by: NURSE PRACTITIONER

## 2021-12-06 PROCEDURE — 91300 COVID-19, PFIZER VACCINE 30MCG/0.3ML DOSE: CPT | Performed by: NURSE PRACTITIONER

## 2022-01-21 ENCOUNTER — OFFICE VISIT (OUTPATIENT)
Dept: PRIMARY CARE CLINIC | Age: 62
End: 2022-01-21
Payer: COMMERCIAL

## 2022-01-21 VITALS
HEART RATE: 94 BPM | DIASTOLIC BLOOD PRESSURE: 83 MMHG | OXYGEN SATURATION: 95 % | WEIGHT: 189 LBS | BODY MASS INDEX: 28.74 KG/M2 | TEMPERATURE: 98.1 F | SYSTOLIC BLOOD PRESSURE: 128 MMHG

## 2022-01-21 DIAGNOSIS — R05.9 COUGH IN ADULT: Primary | ICD-10-CM

## 2022-01-21 DIAGNOSIS — R09.81 HEAD CONGESTION: ICD-10-CM

## 2022-01-21 DIAGNOSIS — U07.1 COVID-19: ICD-10-CM

## 2022-01-21 LAB
Lab: ABNORMAL
PERFORMING INSTRUMENT: ABNORMAL
QC PASS/FAIL: ABNORMAL
SARS-COV-2, POC: DETECTED

## 2022-01-21 PROCEDURE — G8428 CUR MEDS NOT DOCUMENT: HCPCS | Performed by: FAMILY MEDICINE

## 2022-01-21 PROCEDURE — 99213 OFFICE O/P EST LOW 20 MIN: CPT | Performed by: FAMILY MEDICINE

## 2022-01-21 PROCEDURE — G8417 CALC BMI ABV UP PARAM F/U: HCPCS | Performed by: FAMILY MEDICINE

## 2022-01-21 PROCEDURE — G8484 FLU IMMUNIZE NO ADMIN: HCPCS | Performed by: FAMILY MEDICINE

## 2022-01-21 PROCEDURE — 87426 SARSCOV CORONAVIRUS AG IA: CPT | Performed by: FAMILY MEDICINE

## 2022-01-21 PROCEDURE — 1036F TOBACCO NON-USER: CPT | Performed by: FAMILY MEDICINE

## 2022-01-21 PROCEDURE — 3017F COLORECTAL CA SCREEN DOC REV: CPT | Performed by: FAMILY MEDICINE

## 2022-01-21 RX ORDER — AZITHROMYCIN 250 MG/1
250 TABLET, FILM COATED ORAL SEE ADMIN INSTRUCTIONS
Qty: 6 TABLET | Refills: 0 | Status: SHIPPED | OUTPATIENT
Start: 2022-01-21 | End: 2022-01-26

## 2022-01-21 RX ORDER — METHYLPREDNISOLONE 4 MG/1
TABLET ORAL
Qty: 1 KIT | Refills: 1 | Status: SHIPPED
Start: 2022-01-21 | End: 2022-03-04

## 2022-01-21 ASSESSMENT — ENCOUNTER SYMPTOMS
CHEST TIGHTNESS: 0
EYES NEGATIVE: 1
APNEA: 0
RESPIRATORY NEGATIVE: 1
SHORTNESS OF BREATH: 0
GASTROINTESTINAL NEGATIVE: 1
STRIDOR: 0
WHEEZING: 0
SORE THROAT: 0
TROUBLE SWALLOWING: 0
ALLERGIC/IMMUNOLOGIC NEGATIVE: 1

## 2022-01-21 ASSESSMENT — PATIENT HEALTH QUESTIONNAIRE - PHQ9
1. LITTLE INTEREST OR PLEASURE IN DOING THINGS: 0
SUM OF ALL RESPONSES TO PHQ9 QUESTIONS 1 & 2: 0
SUM OF ALL RESPONSES TO PHQ QUESTIONS 1-9: 0
2. FEELING DOWN, DEPRESSED OR HOPELESS: 0
SUM OF ALL RESPONSES TO PHQ QUESTIONS 1-9: 0

## 2022-01-21 NOTE — PROGRESS NOTES
22  Name: Mariah Glass    : 1960    Sex: female    Age: 62 y.o. Subjective:  Chief Complaint: Patient is here for  Cough  Sinus  ears ache  Body ache     No temp  Sl chills       Headache   Taste and smell   ok      Review of Systems   Constitutional: Positive for chills and fatigue. Negative for diaphoresis and fever. HENT: Negative. Negative for sore throat and trouble swallowing. Congestion: nasal congestion. Eyes: Negative. Respiratory: Negative. Negative for apnea, chest tightness, shortness of breath, wheezing and stridor. Cough: productive with yellow mucus. No temperature or sweats or chills   Cardiovascular: Negative. Gastrointestinal: Negative. Endocrine: Negative. Genitourinary: Negative. Musculoskeletal: Negative. Skin: Negative. Allergic/Immunologic: Negative. Neurological: Negative. Hematological: Negative. Psychiatric/Behavioral: Negative.           Current Outpatient Medications:     methylPREDNISolone (MEDROL DOSEPACK) 4 MG tablet, Take by mouth., Disp: 1 kit, Rfl: 1    azithromycin (ZITHROMAX) 250 MG tablet, Take 1 tablet by mouth See Admin Instructions for 5 days 500mg on day 1 followed by 250mg on days 2 - 5, Disp: 6 tablet, Rfl: 0    cyclobenzaprine (FLEXERIL) 10 MG tablet, Take 1 tablet by mouth 2 times daily as needed for Muscle spasms, Disp: 90 tablet, Rfl: 3    Continuous Blood Gluc Transmit (DEXCOM G5 MOBILE TRANSMITTER) MISC, 1 kit by Does not apply route every 2 hours Insurance will not cover    She wants to pay cash for it, Disp: 1 each, Rfl: 12    Estradiol-Estriol-Progesterone (BIEST/PROGESTERONE TD), Place onto the skin 0.5 ML qhs, Disp: , Rfl:     ELIDEL 1 % cream, , Disp: , Rfl:     thyroid (ARMOUR THYROID) 120 MG tablet, Take 120 mg by mouth daily No longer takes, Disp: , Rfl:   No Known Allergies  Social History     Socioeconomic History    Marital status:      Spouse name: Not on file    Number of children: Not on file    Years of education: Not on file    Highest education level: Not on file   Occupational History    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   Tobacco Use    Smoking status: Former Smoker     Types: Cigarettes     Quit date: 2012     Years since quitting: 10.0    Smokeless tobacco: Never Used    Tobacco comment: 2012   Substance and Sexual Activity    Alcohol use: Yes     Comment: social    Drug use: No    Sexual activity: Yes     Partners: Male   Other Topics Concern    Not on file   Social History Narrative        HEMATOCHROMATOSIS    SLIGHT HYPOGLYDEMIA    PMS    LOW PHOSPOHOROUS IN PAST    LIPID     [address] LEVEL    SMOKER--QUIT 12    ABLATION UTERUS    BTS    L OVARY REMOVED DUE TO CYST    LOW BACK PAIN---DISC---YARAB WITH SHOTS AND SAW [facility]    Masood Dietz  1960 Page #2    P- HYSTER 10-10    MGR 22 ABODO----PROMOTED TO SENIOR      MAIDEN NAME ISMA    GRANDMOTHER ON MOTHERS SIDE WITH BREAST CA    ER VISIT  WITH ELEV LFT AND GB SLUDGE    ELEV LFT     TMT     ELEV BP     LEFT FOOT OR     SLEEP APNEA---REFUSES TX    GERD     ELEV LFT     GL SLUDGE -----STONES ON US ----GB OUT DR ZARA MASTERSON WITH MILED GERD     COLONOSCOPY AND EGD  ZARA----7 TO 10 YRS--- MELANOXIX COLI---POSS FROM    OLD HEMORRHOID OR PER PT    EGD  DR ZUÑIGA    GYN [place] STARTED HRT AND ARMOUR THRYOID     GRANDMOHTER ON MOTHERS SIDE WITH BREAST CA    EVAL WITH COUNSELOR 10-16 FOR OVER EATING    EVAL DR ELLER  ELEV D DIMER AND CTA NEG------ ECHO DONE---- VQ NEG----    UGI  WITH MINIMAL GERD    ELEV CREA 1.1 ON  AND ON 4-10 AFTER CT CHEST WAS 1,14    NEG TMT AND ECHO  DR SPANN----OFFERED BETA BLOCKER AND PT REF    RIGHT BUNION FOOT OR DR GARCIA     LOST JOB 18    LEFT FIRST TOE OR DR GARCIA     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 LINE [employer] 12    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   HR    2019   Bro in [employer]      Er with burt with stress    3-19       Ct  Head and neck ok    Gyn [place] jaziel armour thyroid  2018    elev   crea   7-20    Covid  20     Social Determinants of Health     Financial Resource Strain:     Difficulty of Paying Living Expenses: Not on file   Food Insecurity:     Worried About Running Out of Food in the Last Year: Not on file    Gregorio of Food in the Last Year: Not on file   Transportation Needs:     Lack of Transportation (Medical): Not on file    Lack of Transportation (Non-Medical):  Not on file   Physical Activity:     Days of Exercise per Week: Not on file    Minutes of Exercise per Session: Not on file   Stress:     Feeling of Stress : Not on file   Social Connections:     Frequency of Communication with Friends and Family: Not on file    Frequency of Social Gatherings with Friends and Family: Not on file    Attends Pentecostal Services: Not on file    Active Member of [address] [place] [address] or Organizations: Not on file    Attends Club or Organization Meetings: Not on file    Marital Status: Not on file   Intimate Partner Violence:     Fear of Current or Ex-Partner: Not on file    Emotionally Abused: Not on file    Physically Abused: Not on file    Sexually Abused: Not on file   Housing Stability:     Unable to Pay for Housing in the Last Year: Not on file    Number of [place] in the Last Year: Not on file    Unstable Housing in the Last Year: Not on file      Past Medical History:   Diagnosis Date    Abnormal Pap smear of cervix     Gallstones     Menopausal symptoms     Osteopenia     SOB (shortness of breath)     In am and Pm resting    Weight gain      Family History   Problem Relation Age of Onset    Other Father          [id number] Aortic aneurysm 10 cm AAA repair    No Known Problems Sister     No Known Problems Brother     No Known Problems Brother       Past Surgical History: Procedure Laterality Date    FOOT NEUROMA SURGERY Bilateral 2012    DR Fernanda Williamson    HYSTERECTOMY  2010    DR Tonia Kumar ROTATOR CUFF REPAIR  2004, 2005    RIGHT      Vitals:    01/21/22 0923   BP: 128/83   Pulse: 94   Temp: 98.1 °F (36.7 °C)   TempSrc: Infrared   SpO2: 95%   Weight: 189 lb (85.7 kg)       Objective:    Physical Exam  Vitals reviewed. Constitutional:       Appearance: She is well-developed. HENT:      Head: Normocephalic. Eyes:      Pupils: Pupils are equal, round, and reactive to light. Cardiovascular:      Rate and Rhythm: Normal rate and regular rhythm. Pulmonary:      Effort: Pulmonary effort is normal.      Breath sounds: Normal breath sounds. Abdominal:      Palpations: Abdomen is soft. Musculoskeletal:         General: Normal range of motion. Cervical back: Normal range of motion. Skin:     General: Skin is warm. Neurological:      Mental Status: She is alert and oriented to person, place, and time. Psychiatric:         Behavior: Behavior normal.         Johny Best was seen today for congestion, cough, fever and headache. Diagnoses and all orders for this visit:    Cough in adult  -     POCT COVID-19, Antigen    Head congestion  -     POCT COVID-19, Antigen    COVID-19  -     methylPREDNISolone (MEDROL DOSEPACK) 4 MG tablet; Take by mouth.  -     azithromycin (ZITHROMAX) 250 MG tablet; Take 1 tablet by mouth See Admin Instructions for 5 days 500mg on day 1 followed by 250mg on days 2 - 5        Comments: coivd rapid pos   meds         covd prescribed. Over-the-counter zinc and vitamin C. Purchase an oximeter and call if oxygen saturation less than 90%. If any temperature over 102 degree, shortness of breath,  chest pain go to the emergency room. Complete isolation until results are back from the Covid testing. Do not work until results are back. A great deal of time spent reviewing medications, diet, exercise, social issues.  Also reviewing the chart before entering the room with patient and finishing charting after leaving patient's room. More than half of that time was spent face to face with the patient in counseling and coordinating care. Follow Up: Return if symptoms worsen or fail to improve, for Reg Appt, Meds. If worse go to ER. Not better in 24 hr see.      Seen by:  Danielle Cardona, DO

## 2022-03-04 ENCOUNTER — APPOINTMENT (OUTPATIENT)
Dept: GENERAL RADIOLOGY | Age: 62
End: 2022-03-04
Payer: COMMERCIAL

## 2022-03-04 ENCOUNTER — HOSPITAL ENCOUNTER (EMERGENCY)
Age: 62
Discharge: HOME | End: 2022-03-04
Attending: EMERGENCY MEDICINE
Payer: COMMERCIAL

## 2022-03-04 VITALS
HEART RATE: 93 BPM | BODY MASS INDEX: 28.04 KG/M2 | TEMPERATURE: 98 F | WEIGHT: 185 LBS | HEIGHT: 68 IN | DIASTOLIC BLOOD PRESSURE: 84 MMHG | SYSTOLIC BLOOD PRESSURE: 138 MMHG | OXYGEN SATURATION: 99 % | RESPIRATION RATE: 16 BRPM

## 2022-03-04 DIAGNOSIS — S67.02XA CRUSHING INJURY OF LEFT THUMB, INITIAL ENCOUNTER: ICD-10-CM

## 2022-03-04 DIAGNOSIS — S61.319A: Primary | ICD-10-CM

## 2022-03-04 PROCEDURE — 73140 X-RAY EXAM OF FINGER(S): CPT

## 2022-03-04 PROCEDURE — 99283 EMERGENCY DEPT VISIT LOW MDM: CPT

## 2022-03-04 NOTE — ED PROVIDER NOTES
HPI:  3/4/22, Time: 9:56 AM TRACEY Sheppard is a 62 y.o. female presenting to the ED for right thumb pain and swelling with fingernail laceration after getting her thumb smashed in glass shower door, beginning a couple hours ago. Shower door remained intact. Declines pain medicine. Declines tetanus booster. The complaint has been persistent, moderate in severity, and worsened by movement. She is right-handed. Patient denies any other complaints at this time. ROS:   A complete review of systems was performed and all pertinent positives and negatives are stated within HPI, all other systems reviewed and are negative.      --------------------------------------------- PAST HISTORY ---------------------------------------------  Past Medical History:  has a past medical history of Abnormal Pap smear of cervix, Gallstones, Menopausal symptoms, Osteopenia, SOB (shortness of breath), and Weight gain. Past Surgical History:  has a past surgical history that includes Hysterectomy (2010); Foot neuroma surgery (Bilateral, 2012); and Rotator cuff repair (2004, 2005). Social History:  reports that she quit smoking about 10 years ago. Her smoking use included cigarettes. She has never used smokeless tobacco. She reports current alcohol use. She reports that she does not use drugs. Family History: family history includes No Known Problems in her brother, brother, and sister; Other in her father. The patients home medications have been reviewed. Allergies: Patient has no known allergies.         ----------------------------------------PHYSICAL EXAM--------------------------------------  Constitutional:  Well developed, well nourished, no acute distress, non-toxic appearance   Eyes:  PERRL, conjunctiva normal, EOMI  HENT:  Atraumatic, external ears normal, nose normal, oropharynx moist. Neck- normal range of motion, no nuchal rigidity   Respiratory:  No respiratory distress   Cardiovascular: Normal rate, normal rhythm. Radial  pulses 2+ bilaterally. Musculoskeletal:  No edema, no deformities. Right distal thumb with tenderness to palpation and mild swelling with associated fingernail laceration horizontally across the mid nail. No limitation in range of motion of all DIP, PIP and MCP joints of the right hand. No visible wrist deformity, swelling. No limitation in range of motion. No snuffbox tenderness. Integument:  Well hydrated, no visible rash. Adequate perfusion. Neurologic:  Alert & oriented x 3, CN 2-12 normal, no focal deficits noted. Normal gait. Median, radial and ulnar nerve sensation intact to light touch and strength 5/5. Recurrent branch of right median nerve intact. Psychiatric:  Speech and behavior appropriate       -------------------------------------------------- RESULTS -------------------------------------------------  I have personally reviewed all laboratory and imaging results for this patient. Results are listed below. LABS:  No results found for this visit on 03/04/22. RADIOLOGY:  Interpreted by Radiologist.  XR FINGER RIGHT (MIN 2 VIEWS)   Preliminary Result   Irregularity of the overlying nail of the 1st digit with no acute bony   abnormality present.             ------------------------- NURSING NOTES AND VITALS REVIEWED ---------------------------  The nursing notes within the ED encounter and vital signs as below have been reviewed by myself. /84   Pulse 93   Temp 98 °F (36.7 °C) (Temporal)   Resp 16   Ht 5' 8\" (1.727 m)   Wt 185 lb (83.9 kg)   SpO2 99%   BMI 28.13 kg/m²   Oxygen Saturation Interpretation: Normal      The patients available past medical records and past encounters were reviewed. ------------------------------ ED COURSE/MEDICAL DECISION MAKING----------------------  Medications - No data to display        Procedures:   none      Medical Decision Making:    Neg acute fracutre on Xray.  There is no subungual hematoma noted but fingernail is already lacerated so there is no need for trephination at this point. Her finger was dressed in a splint placed on for protection while it heals. She was advised that as the fingernail grows in the laceration grows out that her fingernail may repair itself or require removal in the future. She understands and agrees with the plan. This patient's ED course included: re-evaluation prior to disposition and a personal history and physicial eaxmination    This patient has remained hemodynamically stable and improved during their ED course. Darryl Page, DO, am the Primary Provider of Record    Counseling: The emergency provider has spoken with the patient and discussed todays results, in addition to providing specific details for the plan of care and counseling regarding the diagnosis and prognosis. Questions are answered at this time and they are agreeable with the plan.    --------------------------- IMPRESSION AND DISPOSITION ---------------------------------    IMPRESSION  1. Deep laceration of fingernail    2.  Crushing injury of left thumb, initial encounter        DISPOSITION  Disposition: Discharge to home  Patient condition is stable             Viet Montero DO  03/04/22 1108

## 2022-08-16 ENCOUNTER — OFFICE VISIT (OUTPATIENT)
Dept: PRIMARY CARE CLINIC | Age: 62
End: 2022-08-16
Payer: COMMERCIAL

## 2022-08-16 VITALS
SYSTOLIC BLOOD PRESSURE: 132 MMHG | TEMPERATURE: 98.1 F | WEIGHT: 186 LBS | HEIGHT: 68 IN | BODY MASS INDEX: 28.19 KG/M2 | DIASTOLIC BLOOD PRESSURE: 78 MMHG

## 2022-08-16 DIAGNOSIS — Z12.11 SCREEN FOR COLON CANCER: ICD-10-CM

## 2022-08-16 DIAGNOSIS — F51.01 PRIMARY INSOMNIA: ICD-10-CM

## 2022-08-16 DIAGNOSIS — Z00.01 ENCOUNTER FOR ANNUAL GENERAL MEDICAL EXAMINATION WITH ABNORMAL FINDINGS IN ADULT: Primary | ICD-10-CM

## 2022-08-16 PROCEDURE — 99396 PREV VISIT EST AGE 40-64: CPT | Performed by: FAMILY MEDICINE

## 2022-08-16 RX ORDER — TRAZODONE HYDROCHLORIDE 50 MG/1
TABLET ORAL
Qty: 180 TABLET | Refills: 3 | Status: SHIPPED | OUTPATIENT
Start: 2022-08-16

## 2022-08-16 ASSESSMENT — ENCOUNTER SYMPTOMS
GASTROINTESTINAL NEGATIVE: 1
ALLERGIC/IMMUNOLOGIC NEGATIVE: 1
RESPIRATORY NEGATIVE: 1
EYES NEGATIVE: 1

## 2022-08-16 ASSESSMENT — PATIENT HEALTH QUESTIONNAIRE - PHQ9
SUM OF ALL RESPONSES TO PHQ QUESTIONS 1-9: 0
SUM OF ALL RESPONSES TO PHQ QUESTIONS 1-9: 0
SUM OF ALL RESPONSES TO PHQ9 QUESTIONS 1 & 2: 0
2. FEELING DOWN, DEPRESSED OR HOPELESS: 0
SUM OF ALL RESPONSES TO PHQ QUESTIONS 1-9: 0
1. LITTLE INTEREST OR PLEASURE IN DOING THINGS: 0
SUM OF ALL RESPONSES TO PHQ QUESTIONS 1-9: 0

## 2022-08-16 NOTE — PROGRESS NOTES
22  Name: Wilmot Councilman    : 1960    Sex: female    Age: 62 y.o. Subjective:  Chief Complaint: Patient is here for wellenss  yearly  ck     Feel ok   seeing  [facility] and lab done  Stil ojn hormone  replacement  Not sleep well       they referred to slepe  do can d set up for sleept study   took Erlene Francois past and not do well with er  Dneis  snoring  [facility] chg diet and much better  off dairy  gluten sugar      Review of Systems   Constitutional: Negative. HENT: Negative. Eyes: Negative. Respiratory: Negative. Cardiovascular: Negative. Gastrointestinal: Negative. Endocrine: Negative. Genitourinary: Negative. Musculoskeletal: Negative. Skin: Negative. Allergic/Immunologic: Negative. Neurological: Negative. Hematological: Negative. Psychiatric/Behavioral: Negative.          Current Outpatient Medications:     traZODone (DESYREL) 50 MG tablet, One or two q hs prn sleep, Disp: 180 tablet, Rfl: 3    Estradiol-Estriol-Progesterone (BIEST/PROGESTERONE TD), Place onto the skin 0.5 ML qhs, Disp: , Rfl:   No Known Allergies  Social History     Socioeconomic History    Marital status:      Spouse name: Not on file    Number of children: Not on file    Years of education: Not on file    Highest education level: Not on file   Occupational History    Occupation: [employer] [address] [employer]   Tobacco Use    Smoking status: Former     Types: Cigarettes     Quit date: 2012     Years since quitting: 10.6    Smokeless tobacco: Never    Tobacco comments:     2012   Substance and Sexual Activity    Alcohol use: Yes     Comment: social    Drug use: No    Sexual activity: Yes     Partners: Male   Other Topics Concern    Not on file   Social History Narrative        [address] PAST    LIPID    LOW [facility]    SMOKER--QUIT 12    ABLATION UTERUS    BTS    L OVARY REMOVED DUE TO CYST LOW BACK PAIN---DISC---YARAB WITH SHOTS AND SAW [facility]    Manan Baermatthew  1960 Page #2    P- HYSTER 10-10    MGR 22 NICE HOME SAVINGS----PROMOTED TO SENIOR      MAIDEN NAME ISMA    GRANDMOTHER ON MOTHERS SIDE WITH BREAST CA    ER VISIT  WITH ELEV LFT AND GB SLUDGE    ELEV LFT     TMT     ELEV BP     LEFT FOOT OR     SLEEP APNEA--- intolerant to c pap     GERD     ELEV LFT     GL SLUDGE -----STONES ON US ----GB OUT DR ZARA MASTERSON WITH MILED GERD     COLONOSCOPY AND EGD  ZARA----7 TO 10 YRS--- MELANOXIX COLI---POSS FROM    OLD HEMORRHOID OR PER PT    EGD  DR Funmi Del [place] [address] 87 STARTED HRT AND ARMOUR THRYOID     GRANDMOHTER ON MOTHERS SIDE WITH BREAST CA    EVAL WITH COUNSELOR 10-16 FOR OVER EATING    EVAL DR ELLER  ELEV D DIMER AND CTA NEG------ ECHO DONE---- VQ NEG----    UGI  WITH MINIMAL GERD    ELEV CREA 1.1 ON  AND ON 4-10 AFTER CT CHEST WAS 1,14    NEG TMT AND ECHO  DR SPANN----OFFERED BETA BLOCKER AND PT REF    RIGHT BUNION FOOT OR DR Waqar Phillips     LOST JOB 18    LEFT FIRST TOE OR DR Waqar Phillips     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 LINE [place]     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      Er with burt with stress    3-19       Ct  Head and neck ok    Gyn [place] gave armour thyroid      elev   crea   7-20    Covid  20    [facility] functinal med  at [facility]      Insomnia referred to sleep doc and [facility] sleep study     Social Determinants of Health     Financial Resource Strain: Not on file   Food Insecurity: Not on file   Transportation Needs: Not on file   Physical Activity: Not on file   Stress: Not on file   Social Connections: Not on file   Intimate Partner Violence: Not on file   Housing Stability: Not on file      Past Medical History:   Diagnosis Date    Abnormal Pap smear of cervix     Gallstones     Menopausal symptoms Osteopenia     SOB (shortness of breath)     In am and Pm resting    Weight gain      Family History   Problem Relation Age of Onset    Other Father          [id number] Aortic aneurysm 10 cm AAA repair    No Known Problems Sister     No Known Problems Brother     No Known Problems Brother       Past Surgical History:   Procedure Laterality Date    FOOT NEUROMA SURGERY Bilateral 2012    DR Criselda Mckeon    HYSTERECTOMY (CERVIX STATUS UNKNOWN)  2010    DR Domenico Nelson CUFF REPAIR  2004, 2005    RIGHT      Vitals:    08/16/22 0743   BP: 132/78   Temp: 98.1 °F (36.7 °C)   TempSrc: Oral   Weight: 186 lb (84.4 kg)   Height: 5' 8\" (1.727 m)       Objective:    Physical Exam  Vitals reviewed. Constitutional:       Appearance: Normal appearance. She is well-developed. HENT:      Head: Normocephalic. Right Ear: Tympanic membrane normal.      Left Ear: Tympanic membrane normal.      Nose: Nose normal.      Mouth/Throat:      Mouth: Mucous membranes are moist.   Eyes:      Pupils: Pupils are equal, round, and reactive to light. Cardiovascular:      Rate and Rhythm: Normal rate and regular rhythm. Pulmonary:      Effort: Pulmonary effort is normal.      Breath sounds: Normal breath sounds. Abdominal:      General: Bowel sounds are normal.      Palpations: Abdomen is soft. Musculoskeletal:         General: Normal range of motion. Cervical back: Normal range of motion. Skin:     General: Skin is warm. Neurological:      Mental Status: She is alert and oriented to person, place, and time. Psychiatric:         Behavior: Behavior normal.       Rohit was seen today for annual exam.    Diagnoses and all orders for this visit:    Encounter for annual general medical examination with abnormal findings in adult    Primary insomnia  -     traZODone (DESYREL) 50 MG tablet;  One or two q hs prn sleep    Screen for colon cancer  -     [address], MD, General Surgery, [facility]      Comments: add araceli Billingsley educated the patient about all medications. Make sure they were correct and educated  on the risk associated with missing meds or taking them incorrectly. A list of medications is being sent home with patient today. I educated the patient about all medications. Make sure they were correct and educated  on the risk associated with missing meds or taking them incorrectly. A list of medications is being sent home with patient today. Check blood pressure at home twice a day. Low-salt low caffeine diet. Call if systolic blood pressure is above 155 and diastolic blood pressures above 85. Only use a upper arm digital cuff. Aggressive low-fat diet. Avoid red meats, greasy fried foods, dairy products. Avoid processed foods. Take cholesterol medications without food. A great deal of time spent reviewing medications, diet, exercise, social issues. Also reviewing the chart before entering the room with patient and finishing charting after leaving patient's room. More than half of that time was spent face to face with the patient in counseling and coordinating care. I informed patient about the risk associated with noncompliance of medication and taking medications incorrectly. Appropriate follow-up with myself and all specialist.  Encourage family members to take active role in assisting with medications and medical care.   If any confusion should develop to notify my office immediately to avoid risk of worsening medical condition      Follow Up: Return for See Referral.     Seen by:  Kenia Stephen, DO

## 2022-09-20 ENCOUNTER — OFFICE VISIT (OUTPATIENT)
Dept: SURGERY | Age: 62
End: 2022-09-20

## 2022-09-20 VITALS
SYSTOLIC BLOOD PRESSURE: 132 MMHG | TEMPERATURE: 97.5 F | DIASTOLIC BLOOD PRESSURE: 85 MMHG | WEIGHT: 190 LBS | HEART RATE: 84 BPM | HEIGHT: 68 IN | RESPIRATION RATE: 18 BRPM | BODY MASS INDEX: 28.79 KG/M2

## 2022-09-20 DIAGNOSIS — Z12.11 ENCOUNTER FOR SCREENING COLONOSCOPY: Primary | ICD-10-CM

## 2022-09-20 PROCEDURE — S0285 CNSLT BEFORE SCREEN COLONOSC: HCPCS | Performed by: SURGERY

## 2022-09-20 NOTE — PROGRESS NOTES
[facility] Note    Assessment/Plan:      Diagnosis Orders   1. Encounter for screening colonoscopy      Plan for colonoscopy            Return for Colonoscopy. Chief Complaint   Patient presents with    New Patient     Screening colonoscopy       PCP: Venus Carroll DO    HPI: Kris Soni is a 62 y.o. female who presents in consultation for colonoscopy. Her last was 10 years ago. It was unremarkable she states. This was with Dr. Phil Palafox. She denies any issues. She has no problems diarrhea or constipation. She has no melena or hematochezia. There is no abdominal pain or unintentional weight loss. There are no bowel caliber changes. There is no family history of colon cancer or inflammatory bowel disease. Past Medical History:   Diagnosis Date    Abnormal Pap smear of cervix     Gallstones     Menopausal symptoms     Osteopenia     SOB (shortness of breath)     In am and Pm resting    Weight gain        Past Surgical History:   Procedure Laterality Date    CHOLECYSTECTOMY, LAPAROSCOPIC      FOOT NEUROMA SURGERY Bilateral 2012    DR Abdias Laughlin    HYSTERECTOMY (CERVIX STATUS UNKNOWN)  2010    DR Hickey [facility] REPAIR  2004, 2005    RIGHT       Prior to Admission medications    Medication Sig Start Date End Date Taking?  Authorizing Provider   traZODone (DESYREL) 50 MG tablet One or two q hs prn sleep 8/16/22  Yes Gilbert Rabago DO   Estradiol-Estriol-Progesterone (BIEST/PROGESTERONE TD) Place onto the skin 0.5 ML qhs   Yes Historical Provider, MD       No Known Allergies    Social History     Socioeconomic History    Marital status:      Spouse name: None    Number of children: None    Years of education: None    Highest education level: None   Occupational History    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   Tobacco Use    Smoking status: Former     Types: Cigarettes     Quit date: 1/6/2012     Years since quitting: 10.7    Smokeless tobacco: Never Tobacco comments:     2012   Substance and Sexual Activity    Alcohol use: Yes     Comment: social    Drug use: No    Sexual activity: Yes     Partners: Male   Social History Narrative        HEMATOCHROMATOSIS    SLIGHT HYPOGLYDEMIA    PMS    LOW PHOSPOHOROUS IN PAST    LIPID     [address] LEVEL    SMOKER--QUIT 12    ABLATION UTERUS    BTS    L OVARY REMOVED DUE TO CYST    LOW BACK PAIN---DISC---YARAB WITH SHOTS AND SAW [facility]    Blossom Dietz  1960 Page #2    P- HYSTER 10-10    MGR 22 [employer] HOME SAVINGS----PROMOTED TO SENIOR      MAIDEN NAME ISMA    GRANDMOTHER ON MOTHERS SIDE WITH BREAST CA    ER VISIT  WITH ELEV LFT AND GB SLUDGE    ELEV LFT     TMT     ELEV BP     LEFT FOOT OR     SLEEP APNEA--- intolerant to c pap     GERD     ELEV LFT     GL SLUDGE -----STONES ON US ----GB OUT DR ZARA MASTERSON WITH MILED GERD     COLONOSCOPY AND EGD  ZARA----7 TO 10 YRS--- MELANOXIX COLI---POSS FROM    OLD HEMORRHOID OR PER PT    EGD  DR ZUÑIGA    GYN [place] STARTED HRT AND ARMOUR THRYOID     GRANDMOHTER ON MOTHERS SIDE WITH BREAST CA    EVAL WITH COUNSELOR 10-16 FOR OVER EATING    EVAL DR ELLER  ELEV D DIMER AND CTA NEG------ ECHO DONE---- VQ NEG----    UGI  WITH MINIMAL GERD    ELEV CREA 1.1 ON  AND ON 4-10 AFTER CT CHEST WAS 1,14    NEG TMT AND ECHO  DR SPANN----OFFERED BETA BLOCKER AND PT REF    RIGHT BUNION FOOT OR DR Hunter Beverage     LOST JOB 18    LEFT FIRST TOE OR DR GARCIA     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 LINE [place]     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 Objective:  Vitals:    09/20/22 1401   BP: 132/85   Pulse: 84   Resp: 18   Temp: 97.5 °F (36.4 °C)   TempSrc: Temporal   Weight: 190 lb (86.2 kg)   Height: 5' 8\" (1.727 m)          Physical Exam  HENT:      Head: Normocephalic and atraumatic. Eyes:      General:         Right eye: No discharge. Left eye: No discharge. Neck:      Trachea: No tracheal deviation. Cardiovascular:      Rate and Rhythm: Normal rate. Pulmonary:      Effort: Pulmonary effort is normal. No respiratory distress. Abdominal:      General: There is no distension. Palpations: Abdomen is soft. Tenderness: There is no abdominal tenderness. There is no guarding or rebound. Skin:     General: Skin is warm and dry. Neurological:      Mental Status: She is alert and oriented to person, place, and time. Jones Stone MD    I have examined the patient and performed the key aspects of physical exam, reviewed the record (including all pertinent and new radiology images and laboratory findings), and discussed the case with the primary and referring provider where applicable. NOTE: This report, in part or full,may have been transcribed using voice recognition software. Every effort was made to ensure accuracy; however, inadvertent computerized transcription errors may be present. Please excuse any transcriptional grammatical or spelling errors that may have escaped my editorial review.     CC: Lou Figueroa,

## 2022-09-29 ENCOUNTER — TELEPHONE (OUTPATIENT)
Dept: SURGERY | Age: 62
End: 2022-09-29

## 2022-09-29 ENCOUNTER — HOSPITAL ENCOUNTER (OUTPATIENT)
Dept: GENERAL RADIOLOGY | Age: 62
Discharge: HOME | End: 2022-10-01
Payer: COMMERCIAL

## 2022-09-29 VITALS — WEIGHT: 180 LBS | HEIGHT: 68 IN | BODY MASS INDEX: 27.28 KG/M2

## 2022-09-29 DIAGNOSIS — Z12.31 ENCOUNTER FOR SCREENING MAMMOGRAM FOR MALIGNANT NEOPLASM OF BREAST: ICD-10-CM

## 2022-09-29 PROCEDURE — 77063 BREAST TOMOSYNTHESIS BI: CPT

## 2022-09-29 NOTE — TELEPHONE ENCOUNTER
Prior Authorization Form:      DEMOGRAPHICS:                     Patient Name:  Dakota Dietz  Patient :  1960            Insurance:  Payor: [employer] / Plan: [employer] [address] / Product Type: *No Product type* /   Insurance ID Number:    Payer/Plan Subscr  Sex Relation Sub. Ins. ID Effective Group Num   1.  [id number] Marshall Fitzpatrick  1960 Female Self [id number]                                   [address]         DIAGNOSIS & PROCEDURE:                       Procedure/Operation: colonoscopy           CPT Code: 76735    Diagnosis:  screening    ICD10 Code: z12.11    Location:  [facility]    Surgeon:  Guido Chery INFORMATION:                          Date: 23    Time: 11:00am              [facility]                                                       Status:  Outpatient        Special Comments:         Electronically signed by Hafsa Ashraf on 2022 at 12:52 PM

## 2022-09-29 NOTE — TELEPHONE ENCOUNTER
Benita Moya is scheduled for colonoscopy with Dr Jesus Manuel Linda on 1/31/23 at 11:00am. Patient was told to arrive at 10:00am at [facility]. Patient needs to be NPO after midnight the night before procedure. All surgery instructions were explained to the patient and a surgery letter was also mailed out. MA informed patient that PAT will also be calling to review pre-op instructions and medications. Patient verbalized understanding.

## 2022-11-22 ENCOUNTER — OFFICE VISIT (OUTPATIENT)
Dept: FAMILY MEDICINE CLINIC | Age: 62
End: 2022-11-22
Payer: COMMERCIAL

## 2022-11-22 VITALS
TEMPERATURE: 98 F | DIASTOLIC BLOOD PRESSURE: 84 MMHG | WEIGHT: 194 LBS | OXYGEN SATURATION: 96 % | SYSTOLIC BLOOD PRESSURE: 132 MMHG | BODY MASS INDEX: 29.4 KG/M2 | HEART RATE: 86 BPM | HEIGHT: 68 IN | RESPIRATION RATE: 18 BRPM

## 2022-11-22 DIAGNOSIS — R05.9 COUGH, UNSPECIFIED TYPE: ICD-10-CM

## 2022-11-22 DIAGNOSIS — J01.40 ACUTE NON-RECURRENT PANSINUSITIS: Primary | ICD-10-CM

## 2022-11-22 DIAGNOSIS — H69.83 ETD (EUSTACHIAN TUBE DYSFUNCTION), BILATERAL: ICD-10-CM

## 2022-11-22 DIAGNOSIS — R09.82 PND (POST-NASAL DRIP): ICD-10-CM

## 2022-11-22 LAB
Lab: NORMAL
QC PASS/FAIL: NORMAL
SARS-COV-2 RDRP RESP QL NAA+PROBE: NEGATIVE

## 2022-11-22 PROCEDURE — 87635 SARS-COV-2 COVID-19 AMP PRB: CPT | Performed by: EMERGENCY MEDICINE

## 2022-11-22 PROCEDURE — 99213 OFFICE O/P EST LOW 20 MIN: CPT | Performed by: EMERGENCY MEDICINE

## 2022-11-22 PROCEDURE — G8417 CALC BMI ABV UP PARAM F/U: HCPCS | Performed by: EMERGENCY MEDICINE

## 2022-11-22 PROCEDURE — G8427 DOCREV CUR MEDS BY ELIG CLIN: HCPCS | Performed by: EMERGENCY MEDICINE

## 2022-11-22 PROCEDURE — G8484 FLU IMMUNIZE NO ADMIN: HCPCS | Performed by: EMERGENCY MEDICINE

## 2022-11-22 PROCEDURE — 1036F TOBACCO NON-USER: CPT | Performed by: EMERGENCY MEDICINE

## 2022-11-22 PROCEDURE — 3017F COLORECTAL CA SCREEN DOC REV: CPT | Performed by: EMERGENCY MEDICINE

## 2022-11-22 RX ORDER — AMOXICILLIN AND CLAVULANATE POTASSIUM 875; 125 MG/1; MG/1
1 TABLET, FILM COATED ORAL 2 TIMES DAILY
Qty: 20 TABLET | Refills: 0 | Status: SHIPPED | OUTPATIENT
Start: 2022-11-22 | End: 2022-12-02

## 2022-11-22 RX ORDER — BENZONATATE 200 MG/1
200 CAPSULE ORAL 3 TIMES DAILY PRN
Qty: 30 CAPSULE | Refills: 0 | Status: SHIPPED | OUTPATIENT
Start: 2022-11-22 | End: 2022-11-29

## 2022-11-22 RX ORDER — GUAIFENESIN 600 MG/1
600 TABLET, EXTENDED RELEASE ORAL 2 TIMES DAILY
Qty: 30 TABLET | Refills: 0 | Status: SHIPPED | OUTPATIENT
Start: 2022-11-22 | End: 2022-12-07

## 2022-11-22 RX ORDER — PREDNISONE 20 MG/1
20 TABLET ORAL 2 TIMES DAILY
Qty: 10 TABLET | Refills: 0 | Status: SHIPPED | OUTPATIENT
Start: 2022-11-22 | End: 2022-11-27

## 2022-11-22 ASSESSMENT — ENCOUNTER SYMPTOMS
COUGH: 1
NAUSEA: 0
EYE PAIN: 0
BACK PAIN: 0
WHEEZING: 0
EYE REDNESS: 0
SHORTNESS OF BREATH: 0
DIARRHEA: 0
SINUS PRESSURE: 0
VOMITING: 0
ABDOMINAL DISTENTION: 0
SORE THROAT: 0
EYE DISCHARGE: 0

## 2022-11-22 NOTE — PROGRESS NOTES
Recent Results (from the past 24 hour(s))   POCT COVID-19 Rapid, NAAT    Collection Time: 11/22/22 11:29 AM   Result Value Ref Range    SARS-COV-2, RdRp gene Negative Negative    Lot Number [id number]     QC Pass/Fail Pass        Chief Complaint:   Otalgia, Pharyngitis, and Cough      History of Present Illness   HPI:  Lee Ramos is a 65 y.o. female who presents to [facility] today for ear pain, sore throat and cough. Prior to Visit Medications    Medication Sig Taking? Authorizing Provider   predniSONE (DELTASONE) 20 MG tablet Take 1 tablet by mouth 2 times daily for 5 days Yes Hadley Laureano, DO   benzonatate (TESSALON) 200 MG capsule Take 1 capsule by mouth 3 times daily as needed for Cough Yes Hadley Laureano, DO   amoxicillin-clavulanate (AUGMENTIN) 875-125 MG per tablet Take 1 tablet by mouth 2 times daily for 10 days Yes Christy Laureano, DO   guaiFENesin (MUCINEX) 600 MG extended release tablet Take 1 tablet by mouth 2 times daily for 15 days Yes Hadley Laureano, DO   traZODone (DESYREL) 50 MG tablet One or two q hs prn sleep  Gilbert Rabago, DO   Estradiol-Estriol-Progesterone (BIEST/PROGESTERONE TD) Place onto the skin 0.5 ML qhs  Historical Provider, MD       Review of Systems   Review of Systems   Constitutional:  Negative for chills and fever. HENT:  Positive for congestion and ear pain. Negative for sinus pressure and sore throat. Eyes:  Negative for pain, discharge and redness. Respiratory:  Positive for cough. Negative for shortness of breath and wheezing. Cardiovascular:  Negative for chest pain. Gastrointestinal:  Negative for abdominal distention, diarrhea, nausea and vomiting. Genitourinary:  Negative for dysuria and frequency. Musculoskeletal:  Negative for arthralgias and back pain. Skin:  Negative for rash and wound. Neurological:  Negative for weakness and headaches. Hematological:  Negative for adenopathy. Psychiatric/Behavioral: Negative.      All other systems reviewed and are negative. Patient's medical, social, and family history reviewed    Past Medical History:  has a past medical history of Abnormal Pap smear of cervix, Gallstones, Menopausal symptoms, Osteopenia, SOB (shortness of breath), and Weight gain. Past Surgical History:  has a past surgical history that includes Hysterectomy (2010); Foot neuroma surgery (Bilateral, 2012); Rotator cuff repair (2004, 2005); and Cholecystectomy, laparoscopic. Social History:  reports that she quit smoking about 10 years ago. Her smoking use included cigarettes. She has never used smokeless tobacco. She reports current alcohol use. She reports that she does not use drugs. Family History: family history includes Breast Cancer (age of onset: 72) in her maternal grandmother; No Known Problems in her brother, brother, and sister; Other in her father. Allergies: Patient has no known allergies. Physical Exam   Vital Signs:  /84 (Site: Right Upper Arm, Position: Sitting, Cuff Size: Medium Adult)   Pulse 86   Temp 98 °F (36.7 °C) (Temporal)   Resp 18   Ht 5' 8\" (1.727 m)   Wt 194 lb (88 kg)   SpO2 96%   BMI 29.50 kg/m²    Oxygen Saturation Interpretation: Normal.    Physical Exam  Vitals and nursing note reviewed. Constitutional:       Appearance: She is well-developed. HENT:      Head: Normocephalic and atraumatic. Right Ear: Hearing and external ear normal. A middle ear effusion is present. Left Ear: Hearing and external ear normal. A middle ear effusion is present. Nose: Congestion and rhinorrhea present. Mouth/Throat:      Pharynx: Uvula midline. Eyes:      General: Lids are normal.      Conjunctiva/sclera: Conjunctivae normal.      Pupils: Pupils are equal, round, and reactive to light. Cardiovascular:      Rate and Rhythm: Normal rate and regular rhythm. Heart sounds: Normal heart sounds. No murmur heard.   Pulmonary:      Effort: Pulmonary effort is normal.      Breath sounds: Normal breath sounds. Abdominal:      General: Bowel sounds are normal.      Palpations: Abdomen is soft. Abdomen is not rigid. Tenderness: There is no abdominal tenderness. There is no guarding or rebound. Musculoskeletal:      Cervical back: Normal range of motion and neck supple. Skin:     General: Skin is warm and dry. Findings: No abrasion or rash. Neurological:      Mental Status: She is alert and oriented to person, place, and time. GCS: GCS eye subscore is 4. GCS verbal subscore is 5. GCS motor subscore is 6. Cranial Nerves: No cranial nerve deficit. Sensory: No sensory deficit. Coordination: Coordination normal.      Gait: Gait normal.       Test Results Section   (All laboratory and radiology results have been personally reviewed by myself)  Labs:  Results for orders placed or performed in visit on 11/22/22   POCT COVID-19 Rapid, NAAT   Result Value Ref Range    SARS-COV-2, RdRp gene Negative Negative    Lot Number [id number]     QC Pass/Fail Pass         Imaging: All Radiology results interpreted by Radiologist unless otherwise noted. No results found. Assessment / Plan   Impression(s):  Marion Le was seen today for otalgia, pharyngitis and cough. Diagnoses and all orders for this visit:    Acute non-recurrent pansinusitis  -     amoxicillin-clavulanate (AUGMENTIN) 875-125 MG per tablet; Take 1 tablet by mouth 2 times daily for 10 days  -     guaiFENesin (MUCINEX) 600 MG extended release tablet; Take 1 tablet by mouth 2 times daily for 15 days    Cough, unspecified type  -     POCT COVID-19 Rapid, NAAT  -     benzonatate (TESSALON) 200 MG capsule; Take 1 capsule by mouth 3 times daily as needed for Cough    ETD (Eustachian tube dysfunction), bilateral  -     predniSONE (DELTASONE) 20 MG tablet; Take 1 tablet by mouth 2 times daily for 5 days    PND (post-nasal drip)  -     predniSONE (DELTASONE) 20 MG tablet;  Take 1 tablet by mouth 2 times daily for 5 days       Discussed symptomatic treatments with the patient today. The patient is to schedule a follow-up with PCP in the next 2-3 days for reevaluation. Red flag symptoms were also discussed with the patient today. If symptoms worsen the patient is to go directly to the emergency department for reevaluation and treatment. Pt verbalizes understanding and is in agreement with plan of care. All questions answered.       New Medications     New Prescriptions    AMOXICILLIN-CLAVULANATE (AUGMENTIN) 875-125 MG PER TABLET    Take 1 tablet by mouth 2 times daily for 10 days    BENZONATATE (TESSALON) 200 MG CAPSULE    Take 1 capsule by mouth 3 times daily as needed for Cough    GUAIFENESIN (MUCINEX) 600 MG EXTENDED RELEASE TABLET    Take 1 tablet by mouth 2 times daily for 15 days    PREDNISONE (DELTASONE) 20 MG TABLET    Take 1 tablet by mouth 2 times daily for 5 days       Electronically signed by Louie Morales DO   DD: 11/22/22

## 2023-01-23 ENCOUNTER — OFFICE VISIT (OUTPATIENT)
Dept: PODIATRY | Age: 63
End: 2023-01-23
Payer: COMMERCIAL

## 2023-01-23 VITALS
SYSTOLIC BLOOD PRESSURE: 123 MMHG | DIASTOLIC BLOOD PRESSURE: 79 MMHG | TEMPERATURE: 98.1 F | BODY MASS INDEX: 29.5 KG/M2 | WEIGHT: 194 LBS

## 2023-01-23 DIAGNOSIS — S90.422A BLISTER OF LEFT GREAT TOE, INITIAL ENCOUNTER: Primary | ICD-10-CM

## 2023-01-23 PROCEDURE — G8417 CALC BMI ABV UP PARAM F/U: HCPCS | Performed by: PODIATRIST

## 2023-01-23 PROCEDURE — 99213 OFFICE O/P EST LOW 20 MIN: CPT | Performed by: PODIATRIST

## 2023-01-23 PROCEDURE — G8427 DOCREV CUR MEDS BY ELIG CLIN: HCPCS | Performed by: PODIATRIST

## 2023-01-23 PROCEDURE — G8484 FLU IMMUNIZE NO ADMIN: HCPCS | Performed by: PODIATRIST

## 2023-01-23 PROCEDURE — 1036F TOBACCO NON-USER: CPT | Performed by: PODIATRIST

## 2023-01-23 PROCEDURE — 3017F COLORECTAL CA SCREEN DOC REV: CPT | Performed by: PODIATRIST

## 2023-01-23 RX ORDER — CEPHALEXIN 500 MG/1
500 CAPSULE ORAL 2 TIMES DAILY
Qty: 20 CAPSULE | Refills: 0 | Status: SHIPPED | OUTPATIENT
Start: 2023-01-23 | End: 2023-02-02

## 2023-01-23 NOTE — PROGRESS NOTES
23  Montserrat Alvarez Deapatrickis : 1960 Sex: female  Age: 65 y.o. Patient was referred by Michelle Quiñones DO    Chief Complaint   Patient presents with    Blister     Pt called today stating she developed another abscess on left great toe was treated over a year ago with the same issue        SUBJECTIVE seen today well over a year ago she had a blister on her left great toe today she states the same thing is happened. Patient relates to me. Shoes. HPI  Review of Systems  Const: Denies constitutional symptoms  Musculo: Denies symptoms other than stated above  Skin: Denies symptoms other than stated above       Current Outpatient Medications:     silver sulfADIAZINE (SILVADENE) 1 % cream, Apply topically daily. , Disp: 50 g, Rfl: 1    cephALEXin (KEFLEX) 500 MG capsule, Take 1 capsule by mouth 2 times daily for 10 days, Disp: 20 capsule, Rfl: 0    traZODone (DESYREL) 50 MG tablet, One or two q hs prn sleep (Patient not taking: Reported on 2023), Disp: 180 tablet, Rfl: 3    Estradiol-Estriol-Progesterone (BIEST/PROGESTERONE TD), Place onto the skin 0.5 ML qhs (Patient not taking: Reported on 2023), Disp: , Rfl:   No Known Allergies    Past Medical History:   Diagnosis Date    Abnormal Pap smear of cervix     Gallstones     Menopausal symptoms     Osteopenia     SOB (shortness of breath)     In am and Pm resting    Weight gain      Past Surgical History:   Procedure Laterality Date    CHOLECYSTECTOMY, LAPAROSCOPIC      FOOT NEUROMA SURGERY Bilateral     DR Kyaw White    HYSTERECTOMY (CERVIX STATUS UNKNOWN)      DR Cherri Young CUFF REPAIR  ,     RIGHT     Family History   Problem Relation Age of Onset    Other Father          [id number] Aortic aneurysm 10 cm AAA repair    No Known Problems Sister     No Known Problems Brother     No Known Problems Brother     Breast Cancer Maternal Grandmother 72     Social History     Socioeconomic History    Marital status:      Spouse name: Not on file    Number of children: Not on file    Years of education: Not on file    Highest education level: Not on file   Occupational History    Occupation: [employer] [address] [employer]   Tobacco Use    Smoking status: Former     Types: Cigarettes     Quit date: 2012     Years since quittin.0    Smokeless tobacco: Never    Tobacco comments:     2012   Substance and Sexual Activity    Alcohol use: Yes     Comment: social    Drug use: No    Sexual activity: Yes     Partners: Male   Other Topics Concern    Not on file   Social History Narrative        HEMATOCHROMATOSIS    SLIGHT HYPOGLYDEMIA    PMS    LOW PHOSPOHOROUS IN PAST    LIPID     [address] LEVEL    SMOKER--QUIT 12    ABLATION UTERUS    BTS    L OVARY REMOVED DUE TO CYST    LOW BACK PAIN---DISC---YARAB WITH SHOTS AND SAW [facility]    [employer]  1960 Page #2    P- HYSTER 10-10    MGR 22 [employer] HOME SAVINGS----PROMOTED TO SENIOR      MAIDEN NAME ISMA    GRANDMOTHER ON MOTHERS SIDE WITH BREAST CA    ER VISIT  WITH ELEV LFT AND GB SLUDGE    ELEV LFT     TMT     ELEV BP     LEFT FOOT OR     SLEEP APNEA--- intolerant to c pap     GERD     ELEV LFT     GL SLUDGE -----STONES ON US ----GB OUT DR ZARA MASTERSON WITH MILED GERD     COLONOSCOPY AND EGD  ZARA----7 TO 10 YRS--- MELANOXIX COLI---POSS FROM    OLD HEMORRHOID OR PER PT    EGD  DR ZUÑIGA    [place] STARTED HRT AND ARMOUR THRYOID     GRANDMOHTER ON MOTHERS SIDE WITH BREAST CA    EVAL WITH COUNSELOR 10-16 FOR OVER EATING    EVAL DR ELLER  ELEV D DIMER AND CTA NEG------ ECHO DONE---- VQ NEG----    UGI  WITH MINIMAL GERD    ELEV CREA 1.1 ON  AND ON 4-10 AFTER CT CHEST WAS 1,14    NEG TMT AND ECHO  DR SPANN----OFFERED BETA BLOCKER AND PT REF    RIGHT BUNION FOOT OR DR GARCIA     LOST JOB 18    LEFT FIRST TOE OR DR GARCIA     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 LINE [employer] -    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[place]  Business   HR    2019   Bro in [place]      Er with burt with stress    3-19       Ct  Head and neck ok    Gyn latrice gave armour thyroid  2018    elev   crea   7-20    Covid  20    [facility] functinal med  at [facility]      Insomnia referred to sleep doc and penidng sleep study     Social Determinants of Health     Financial Resource Strain: Not on file   Food Insecurity: Not on file   Transportation Needs: Not on file   Physical Activity: Not on file   Stress: Not on file   Social Connections: Not on file   Intimate Partner Violence: Not on file   Housing Stability: Not on file       Vitals:    23 1147   BP: 123/79   Temp: 98.1 °F (36.7 °C)   TempSrc: Temporal   Weight: 194 lb (88 kg)       Focused Lower Extremity Physical Exam:  Vitals:    23 1147   BP: 123/79   Temp: 98.1 °F (36.7 °C)        Foot Exam     Ortho Exam    Vascular: pulses  dp  pt    Capillary Refill Time:   Hair growth  Skin:    Edema:    Neurologic:      Musculoskeletal/ Orthopedic examination:    NAIL  Web space   Derm: The plantar aspect of the left hallux distal phalanx there is a small raised fluid-filled blister noninfected. Assessment and Plan: Today the patient will apply Silvadene ointment I did prescribe Keflex. Padding moleskin reappoint 2 weeks  Sada Pereyra was seen today for blister. Diagnoses and all orders for this visit:    Blister of left great toe, initial encounter    Other orders  -     silver sulfADIAZINE (SILVADENE) 1 % cream; Apply topically daily. -     cephALEXin (KEFLEX) 500 MG capsule; Take 1 capsule by mouth 2 times daily for 10 days      No follow-ups on file. Seen By:  Oumou Fang DPM      Document was created using voice recognition software. Note was reviewed, however may contain grammatical errors.

## 2023-01-27 RX ORDER — M-VIT,TX,IRON,MINS/CALC/FOLIC 27MG-0.4MG
1 TABLET ORAL DAILY
COMMUNITY

## 2023-01-27 NOTE — PROGRESS NOTES
[facility] PRE-ADMISSION TESTING INSTRUCTIONS    The Preadmission Testing patient is instructed accordingly using the following criteria (check applicable):    ARRIVAL INSTRUCTIONS:  [x] Parking the day of Surgery is located in the Main Entrance lot. Upon entering the door, make an immediate right to the surgery reception desk    [x] Bring photo ID and insurance card    [] Bring in a copy of Living will or Durable Power of  papers. [x] Please be sure to arrange for responsible adult to provide transportation to and from the hospital    [x] Please arrange for responsible adult to be with you for the 24 hour period post procedure due to having anesthesia    [x] If you awake am of surgery not feeling well or have temperature >100 please call [phone]    GENERAL INSTRUCTIONS:    [x] Nothing by mouth after midnight, including gum, candy, mints or water    [x] You may brush your teeth, but do not swallow any water    [] Take medications as instructed with 1-2 oz of water    [x] Stop herbal supplements and vitamins 5 days prior to procedure    [] Follow preop dosing of blood thinners per physician instructions    [] Take 1/2 dose of evening insulin, but no insulin after midnight    [] No oral diabetic medications after midnight    [] If diabetic and have low blood sugar or feel symptomatic, take 1-2oz apple juice only    [] Bring inhalers day of surgery    [] Bring C-PAP/ Bi-Pap day of surgery    [] Bring urine specimen day of surgery    [x] Shower or bath with soap, lather and rinse well, AM of Surgery, no lotion, powders or creams to surgical site    [x] Follow bowel prep as instructed per surgeon    [x] No tobacco products within 24 hours of surgery     [x] No alcohol or illegal drug use within 24 hours of surgery.     [x] Jewelry, body piercing's, eyeglasses, contact lenses and dentures are not permitted into surgery (bring cases)      [x] Please do not wear any nail polish, make up or hair products on the day of surgery    [x] You can expect a call the business day prior to procedure to notify you if your arrival time changes    [x] If you receive a survey after surgery we would greatly appreciate your comments    [] Parent/guardian of a minor must accompany their child and remain on the premises  the entire time they are under our care     [] Pediatric patients may bring favorite toy, blanket or comfort item with them    [] A caregiver or family member must remain with the patient during their stay if they are mentally handicapped, have dementia, disoriented or unable to use a call light or would be a safety concern if left unattended    [x] Please notify surgeon if you develop any illness between now and time of surgery (cold, cough, sore throat, fever, nausea, vomiting) or any signs of infections  including skin, wounds, and dental.    [x]  The Outpatient Pharmacy is available to fill your prescription here on your day of surgery, ask your preop nurse for details    [] Other instructions    EDUCATIONAL MATERIALS PROVIDED:    [] PAT Preoperative Education Packet/Booklet     [] Medication List    [] Transfusion bracelet applied with instructions    [] Shower with soap, lather and rinse well, and use CHG wipes provided the evening before surgery as instructed    [] Incentive spirometer with instructions

## 2023-01-30 ENCOUNTER — ANESTHESIA EVENT (OUTPATIENT)
Dept: ENDOSCOPY | Age: 63
End: 2023-01-30
Payer: COMMERCIAL

## 2023-01-31 ENCOUNTER — HOSPITAL ENCOUNTER (OUTPATIENT)
Age: 63
Setting detail: OUTPATIENT SURGERY
Discharge: HOME | End: 2023-01-31
Attending: SURGERY | Admitting: SURGERY
Payer: COMMERCIAL

## 2023-01-31 ENCOUNTER — ANESTHESIA (OUTPATIENT)
Dept: ENDOSCOPY | Age: 63
End: 2023-01-31
Payer: COMMERCIAL

## 2023-01-31 VITALS
TEMPERATURE: 98 F | OXYGEN SATURATION: 99 % | SYSTOLIC BLOOD PRESSURE: 146 MMHG | WEIGHT: 180 LBS | RESPIRATION RATE: 16 BRPM | BODY MASS INDEX: 27.28 KG/M2 | HEIGHT: 68 IN | DIASTOLIC BLOOD PRESSURE: 78 MMHG | HEART RATE: 86 BPM

## 2023-01-31 DIAGNOSIS — Z12.11 COLON CANCER SCREENING: ICD-10-CM

## 2023-01-31 PROBLEM — F41.9 ANXIETY: Chronic | Status: RESOLVED | Noted: 2019-06-24 | Resolved: 2023-01-31

## 2023-01-31 PROBLEM — N18.2 CHRONIC KIDNEY DISEASE, STAGE 2 (MILD): Chronic | Status: RESOLVED | Noted: 2020-12-11 | Resolved: 2023-01-31

## 2023-01-31 PROCEDURE — 3609010300 HC COLONOSCOPY W/BIOPSY SINGLE/MULTIPLE: Performed by: SURGERY

## 2023-01-31 PROCEDURE — 88305 TISSUE EXAM BY PATHOLOGIST: CPT

## 2023-01-31 PROCEDURE — 3700000000 HC ANESTHESIA ATTENDED CARE: Performed by: SURGERY

## 2023-01-31 PROCEDURE — 6360000002 HC RX W HCPCS: Performed by: REGISTERED NURSE

## 2023-01-31 PROCEDURE — 7100000010 HC PHASE II RECOVERY - FIRST 15 MIN: Performed by: SURGERY

## 2023-01-31 PROCEDURE — 2709999900 HC NON-CHARGEABLE SUPPLY: Performed by: SURGERY

## 2023-01-31 PROCEDURE — 7100000011 HC PHASE II RECOVERY - ADDTL 15 MIN: Performed by: SURGERY

## 2023-01-31 PROCEDURE — 3700000001 HC ADD 15 MINUTES (ANESTHESIA): Performed by: SURGERY

## 2023-01-31 PROCEDURE — 45380 COLONOSCOPY AND BIOPSY: CPT | Performed by: SURGERY

## 2023-01-31 PROCEDURE — 2580000003 HC RX 258: Performed by: REGISTERED NURSE

## 2023-01-31 RX ORDER — PROPOFOL 10 MG/ML
INJECTION, EMULSION INTRAVENOUS PRN
Status: DISCONTINUED | OUTPATIENT
Start: 2023-01-31 | End: 2023-01-31 | Stop reason: SDUPTHER

## 2023-01-31 RX ORDER — SODIUM CHLORIDE 9 MG/ML
INJECTION, SOLUTION INTRAVENOUS CONTINUOUS PRN
Status: DISCONTINUED | OUTPATIENT
Start: 2023-01-31 | End: 2023-01-31 | Stop reason: SDUPTHER

## 2023-01-31 RX ADMIN — SODIUM CHLORIDE: 9 INJECTION, SOLUTION INTRAVENOUS at 10:40

## 2023-01-31 RX ADMIN — PROPOFOL 400 MG: 10 INJECTION, EMULSION INTRAVENOUS at 10:43

## 2023-01-31 ASSESSMENT — PAIN - FUNCTIONAL ASSESSMENT: PAIN_FUNCTIONAL_ASSESSMENT: 0-10

## 2023-01-31 ASSESSMENT — PAIN SCALES - GENERAL
PAINLEVEL_OUTOF10: 0
PAINLEVEL_OUTOF10: 0

## 2023-01-31 NOTE — OP NOTE
Colonoscopy Op Note  PATIENT: Loc Dietz    DATE OF PROCEDURE: 1/31/2023    SURGEON: Vimal Young MD    PREOPERATIVE DIAGNOSIS: Low risk colorectal cancer screening    POSTOPERATIVE DIAGNOSIS: Same, severe pan-melanosis, colon polyps    OPERATION: Procedure(s):  COLONOSCOPY WITH BIOPSY    ANESTHESIA: Local monitored anesthesia. ESTIMATED BLOOD LOSS: nil     COMPLICATIONS: None. SPECIMENS:   ID Type Source Tests Collected by Time Destination   A : RANDOM COLON BIOPSIES--MELANOSIS COLI Tissue Colon SURGICAL PATHOLOGY Vimal Young MD 1/31/2023 1053    B : TRANSVERSE COLON POLYP BIOPSY Tissue Colon SURGICAL PATHOLOGY Vimal Young MD 1/31/2023 1057    C : SIGMOID COLON POLYP BIOPSY Tissue Colon SURGICAL PATHOLOGY Vimal Young MD 1/31/2023 1102        HISTORY: The patient is a 65y.o. year old female with history of above preop diagnosis. I recommended colonoscopy with possible biopsy or polypectomy and I explained the risk, benefits, expected outcome, and alternatives to the procedure. Risks included but are not limited to bleeding, infection, respiratory distress, hypotension, and perforation of the colon. The patient understands and is in agreement. PROCEDURE: The patient was given IV conscious sedation per anesthesia. The patient was given supplemental oxygen by nasal cannula. The colonoscope was inserted per rectum and advanced under direct vision to the cecum without difficulty, identified by appendiceal orifice and ileocecal valve. The prep was  good, however smaller lesions were difficult to evaluate and could have been missed due to severe pan-melanosis    FINDINGS:    MINDY: normal    Terminal Ileum: normal    Colon: Severe pan melanosis. Biopsies were taken. In the transverse colon and the sigmoid colon there is a diminutive polyp status post forcep polypectomy.     Rectum/Anus: examined in normal and retroflexed positions - normal    The colon was decompressed and the scope was removed. The withdraw time was approximately 6 minutes. The patient tolerated the procedure well. ASSESSMENT/PLAN:   Follow-up pathology  She is instructed to follow-up with her functional medicine doctor to see if she is taking anything that could be causing her melanosis  Colorectal Cancer Screening - recommend repeat colonoscopy in 5 years (may change pending biopsy results). Sooner if issues/concerns.     Tatyana Prajapati MD  01/31/23  11:05 AM

## 2023-01-31 NOTE — ANESTHESIA POSTPROCEDURE EVALUATION
Department of Anesthesiology  Postprocedure Note    Patient: Blanquita Davsi  MRN: [id number]  YOB: 1960  Date of evaluation: 1/31/2023      Procedure Summary     Date: 01/31/23 Room / Location: [facility] / [facility]    Anesthesia Start: 7865 Anesthesia Stop: 2088    Procedure: COLONOSCOPY WITH BIOPSY Diagnosis:       Colon cancer screening      (Colon cancer screening [Z12.11])    Surgeons: Ramón Choe MD Responsible Provider: Nathen Orlando MD    Anesthesia Type: MAC ASA Status: 2          Anesthesia Type: No value filed.     Rukhsana Phase I: Rukhsana Score: 10    Rukhsana Phase II: Rukhsana Score: 10      Anesthesia Post Evaluation    Patient location during evaluation: bedside  Patient participation: complete - patient participated  Level of consciousness: awake  Pain score: 0  Airway patency: patent  Nausea & Vomiting: no nausea and no vomiting  Complications: no  Cardiovascular status: hemodynamically stable  Respiratory status: acceptable  Hydration status: euvolemic

## 2023-01-31 NOTE — H&P
111 [facility] Note     Assessment/Plan:        Diagnosis Orders   1. Encounter for screening colonoscopy        Plan for colonoscopy                Return for Colonoscopy. Chief Complaint   Patient presents with    New Patient       Screening colonoscopy         PCP: Krysta Peck DO     HPI: Demetrio Butterfield is a 62 y.o. female who presents in consultation for colonoscopy. Her last was 10 years ago. It was unremarkable she states. This was with Dr. Kendall Lisa. She denies any issues. She has no problems diarrhea or constipation. She has no melena or hematochezia. There is no abdominal pain or unintentional weight loss. There are no bowel caliber changes. There is no family history of colon cancer or inflammatory bowel disease. Past Medical History        Past Medical History:   Diagnosis Date    Abnormal Pap smear of cervix      Gallstones      Menopausal symptoms      Osteopenia      SOB (shortness of breath)       In am and Pm resting    Weight gain              Past Surgical History         Past Surgical History:   Procedure Laterality Date    CHOLECYSTECTOMY, LAPAROSCOPIC        FOOT NEUROMA SURGERY Bilateral 2012     DR Lisa Becerra    HYSTERECTOMY (CERVIX STATUS UNKNOWN)   2010     DR [address] [place] [address]   2004, 2005     RIGHT            Home Medications           Prior to Admission medications    Medication Sig Start Date End Date Taking?  Authorizing Provider   traZODone (DESYREL) 50 MG tablet One or two q hs prn sleep 8/16/22   Yes Gilbert Rabago DO   Estradiol-Estriol-Progesterone (BIEST/PROGESTERONE TD) Place onto the skin 0.5 ML qhs     Yes Historical Provider, MD            No Known Allergies     Social History   Social History            Socioeconomic History    Marital status:        Spouse name: None    Number of children: None    Years of education: None    Highest education level: None   Occupational History    Occupation: [employer]   Tobacco Use    Smoking status: Former       Types: Cigarettes       Quit date: 2012       Years since quitting: 10.7    Smokeless tobacco: Never    Tobacco comments:       2012   Substance and Sexual Activity    Alcohol use: Yes       Comment: social    Drug use: No    Sexual activity: Yes       Partners: Male   Social History Narrative           HEMATOCHROMATOSIS     SLIGHT HYPOGLYDEMIA     PMS     LOW PHOSPOHOROUS IN PAST     LIPID      [address] LEVEL     SMOKER--QUIT 12     ABLATION UTERUS     BTS     L OVARY REMOVED DUE TO CYST     LOW BACK PAIN---DISC---YARAB WITH SHOTS AND SAW [facility]     Soto Galeazzi Deamicis  1960 Page #2     P- HYSTER 10-10     MGR 22 [address]----PROMOTED TO SENIOR       MAIDEN NAME ISMA     GRANDMOTHER ON MOTHERS SIDE WITH BREAST CA     ER VISIT  WITH ELEV LFT AND GB SLUDGE     ELEV LFT      TMT      ELEV BP      LEFT FOOT OR      SLEEP APNEA--- intolerant to c pap      GERD      ELEV LFT      GL SLUDGE -----STONES ON US ----GB OUT DR ZARA MASTERSON WITH MILED GERD      COLONOSCOPY AND EGD  ZARA----7 TO 10 YRS--- MELANOXIX COLI---POSS FROM     OLD HEMORRHOID OR PER PT     EGD  DR ZUÑIGA     GYN [place] STARTED HRT AND ARMOUR THRYOID      GRANDMOHTER ON MOTHERS SIDE WITH BREAST CA     EVAL WITH COUNSELOR 10-16 FOR OVER EATING     EVAL DR ELLER  ELEV D DIMER AND CTA NEG------ ECHO DONE---- VQ NEG----     UGI  WITH MINIMAL GERD     ELEV CREA 1.1 ON  AND ON 4-10 AFTER CT CHEST WAS 1,14     NEG TMT AND ECHO  DR SPANN----OFFERED BETA BLOCKER AND PT REF     RIGHT BUNION FOOT OR DR GARCIA      LOST JOB 18     LEFT FIRST TOE OR DR GARCIA      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 [employer]       Er with burt with stress    3-19       Ct  Head and neck ok     [facility] [place] jaziel wilkins thyroid  2018     elev   crea   7-20     Covid  12-29-20     [facility]ctinal med  at [facility]  2022     Insomnia referred to sleep doc and selena sleep study            Family History         Family History   Problem Relation Age of Onset    Other Father            [id number] Aortic aneurysm 10 cm AAA repair    No Known Problems Sister      No Known Problems Brother      No Known Problems Brother              Review of Systems   All other systems reviewed and are negative. Objective:  Vitals       Vitals:     09/20/22 1401   BP: 132/85   Pulse: 84   Resp: 18   Temp: 97.5 °F (36.4 °C)   TempSrc: Temporal   Weight: 190 lb (86.2 kg)   Height: 5' 8\" (1.727 m)            Physical Exam  HENT:      Head: Normocephalic and atraumatic. Eyes:      General:         Right eye: No discharge. Left eye: No discharge. Neck:      Trachea: No tracheal deviation. Cardiovascular:      Rate and Rhythm: Normal rate. Pulmonary:      Effort: Pulmonary effort is normal. No respiratory distress. Abdominal:      General: There is no distension. Palpations: Abdomen is soft. Tenderness: There is no abdominal tenderness. There is no guarding or rebound. Skin:     General: Skin is warm and dry. Neurological:      Mental Status: She is alert and oriented to person, place, and time. Liset Brown MD     I have examined the patient and performed the key aspects of physical exam, reviewed the record (including all pertinent and new radiology images and laboratory findings), and discussed the case with the primary and referring provider where applicable. NOTE: This report, in part or full,may have been transcribed using voice recognition software. Every effort was made to ensure accuracy; however, inadvertent computerized transcription errors may be present.  Please excuse any transcriptional grammatical or spelling errors that may have escaped my editorial review.      CC: Robert Acosta, DO

## 2023-01-31 NOTE — PROGRESS NOTES
Abdomen is soft and non distended post procedure. Bowel sounds are active. Complains of crampy abdominal pain. Remains side lying.

## 2023-01-31 NOTE — ANESTHESIA PRE PROCEDURE
Department of Anesthesiology  Preprocedure Note       Name:  Delmar Hernandez   Age:  65 y.o.  :  1960                                          MRN:  [id number]         Date:  2023      Surgeon: Wilber Kearney):  Soto Coppola MD    Procedure: Procedure(s):  COLORECTAL CANCER SCREENING, NOT HIGH RISK    Medications prior to admission:   Prior to Admission medications    Medication Sig Start Date End Date Taking? Authorizing Provider   Multiple Vitamins-Minerals (THERAPEUTIC MULTIVITAMIN-MINERALS) tablet Take 1 tablet by mouth daily   Yes Historical Provider, MD   silver sulfADIAZINE (SILVADENE) 1 % cream Apply topically daily. Patient taking differently: daily as needed Apply topically daily. 23   Kehinde Edge DPM   cephALEXin (KEFLEX) 500 MG capsule Take 1 capsule by mouth 2 times daily for 10 days  Patient not taking: Reported on 2023  Kehinde Edge DPM   Estradiol-Estriol-Progesterone (BIEST/PROGESTERONE TD) Place onto the skin 0.5 ML every morning    Historical Provider, MD       Current medications:    No current facility-administered medications for this encounter.        Allergies:  No Known Allergies    Problem List:    Patient Active Problem List   Diagnosis Code    Gastroesophageal reflux disease without esophagitis K21.9    Insulin resistance E88.81       Past Medical History:        Diagnosis Date    Osteopenia        Past Surgical History:        Procedure Laterality Date    CHOLECYSTECTOMY, LAPAROSCOPIC      FOOT NEUROMA SURGERY Bilateral     DR Bernardo Hardwick    HYSTERECTOMY (CERVIX STATUS UNKNOWN)      DR Denisha Lyman ROTATOR CUFF REPAIR  ,     RIGHT       Social History:    Social History     Tobacco Use    Smoking status: Former     Types: Cigarettes     Quit date: 2012     Years since quittin.0    Smokeless tobacco: Never    Tobacco comments:     2012   Substance Use Topics    Alcohol use: Yes     Comment: social Counseling given: Not Answered  Tobacco comments: January 06, 2012      Vital Signs (Current):   Vitals:    01/27/23 1155 01/31/23 0948   BP:  (!) 140/76   Pulse:  87   Resp:  20   Temp:  36.5 °C (97.7 °F)   TempSrc:  Temporal   SpO2:  95%   Weight: 180 lb (81.6 kg) 180 lb (81.6 kg)   Height: 5' 8\" (1.727 m) 5' 8\" (1.727 m)                                              BP Readings from Last 3 Encounters:   01/31/23 (!) 140/76   01/23/23 123/79   11/22/22 132/84       NPO Status: Time of last liquid consumption: 2230                        Time of last solid consumption: 1630                        Date of last liquid consumption: 01/30/23                        Date of last solid food consumption: 01/29/23    BMI:   Wt Readings from Last 3 Encounters:   01/31/23 180 lb (81.6 kg)   01/23/23 194 lb (88 kg)   11/22/22 194 lb (88 kg)     Body mass index is 27.37 kg/m². CBC:   Lab Results   Component Value Date/Time    WBC 4.2 03/24/2021 09:42 AM    RBC 4.69 03/24/2021 09:42 AM    HGB 14.6 03/24/2021 09:42 AM    HCT 44.1 03/24/2021 09:42 AM    MCV 94.0 03/24/2021 09:42 AM    RDW 13.3 03/24/2021 09:42 AM     03/24/2021 09:42 AM       CMP:   Lab Results   Component Value Date/Time     12/04/2020 08:14 AM    K 4.8 12/04/2020 08:14 AM     12/04/2020 08:14 AM    CO2 26 12/04/2020 08:14 AM    BUN 14 12/04/2020 08:14 AM    CREATININE 0.8 12/04/2020 08:14 AM    GFRAA >60 12/04/2020 08:14 AM    LABGLOM >60 12/04/2020 08:14 AM    GLUCOSE 103 12/04/2020 08:14 AM    GLUCOSE 130 07/12/2011 05:50 AM    PROT 6.9 12/04/2020 08:14 AM    CALCIUM 9.6 12/04/2020 08:14 AM    BILITOT 0.5 12/04/2020 08:14 AM    ALKPHOS 60 12/04/2020 08:14 AM    AST 17 12/04/2020 08:14 AM    ALT 21 12/04/2020 08:14 AM       POC Tests: No results for input(s): POCGLU, POCNA, POCK, POCCL, POCBUN, POCHEMO, POCHCT in the last 72 hours.     Coags:   Lab Results   Component Value Date/Time    PROTIME 10.1 07/12/2011 05:50 AM INR 0.8 2011 05:50 AM    APTT 28.0 2011 05:50 AM       HCG (If Applicable): No results found for: PREGTESTUR, PREGSERUM, HCG, HCGQUANT     ABGs: No results found for: PHART, PO2ART, RIO5TPA, OGA0NON, BEART, H4RGNDPG     Type & Screen (If Applicable):  No results found for: LABABO, LABRH    Drug/Infectious Status (If Applicable):  No results found for: HIV, HEPCAB    COVID-19 Screening (If Applicable):   Lab Results   Component Value Date/Time    COVID19 Negative 2022 11:29 AM    COVID19 Detected 2022 09:26 AM    COVID19 Not Detected 2020 11:00 AM           Anesthesia Evaluation  Patient summary reviewed no history of anesthetic complications:   Airway: Mallampati: II  TM distance: >3 FB   Neck ROM: full  Mouth opening: > = 3 FB   Dental: normal exam         Pulmonary: breath sounds clear to auscultation  (+) COPD:                            ROS comment: Former: 0.5-1.0 PPD x 34 years  Quit Smokin12       Cardiovascular:Negative CV ROS  Exercise tolerance: good (>4 METS),           Rhythm: regular  Rate: normal           Beta Blocker:  Not on Beta Blocker         Neuro/Psych:   Negative Neuro/Psych ROS              GI/Hepatic/Renal:   (+) GERD:,      Renal disease:         Endo/Other: Negative Endo/Other ROS                    Abdominal:             Vascular: negative vascular ROS. Other Findings:           Anesthesia Plan      MAC     ASA 2       Induction: intravenous. Anesthetic plan and risks discussed with patient. Plan discussed with CRNA.                       IVANA Cantor - CLAYTON Espino MD  30

## 2023-05-04 ENCOUNTER — OFFICE VISIT (OUTPATIENT)
Dept: PRIMARY CARE CLINIC | Age: 63
End: 2023-05-04
Payer: COMMERCIAL

## 2023-05-04 VITALS — TEMPERATURE: 98.2 F | DIASTOLIC BLOOD PRESSURE: 84 MMHG | SYSTOLIC BLOOD PRESSURE: 136 MMHG | HEART RATE: 91 BPM

## 2023-05-04 DIAGNOSIS — B96.89 ACUTE BACTERIAL SINUSITIS: Primary | ICD-10-CM

## 2023-05-04 DIAGNOSIS — J01.90 ACUTE BACTERIAL SINUSITIS: Primary | ICD-10-CM

## 2023-05-04 LAB
INFLUENZA A ANTIGEN, POC: NEGATIVE
INFLUENZA B ANTIGEN, POC: NEGATIVE
LOT EXPIRE DATE: NORMAL
LOT KIT NUMBER: NORMAL
SARS-COV-2, POC: NORMAL
VALID INTERNAL CONTROL: POSITIVE
VENDOR AND KIT NAME POC: NORMAL

## 2023-05-04 PROCEDURE — 99213 OFFICE O/P EST LOW 20 MIN: CPT | Performed by: NURSE PRACTITIONER

## 2023-05-04 PROCEDURE — 3017F COLORECTAL CA SCREEN DOC REV: CPT | Performed by: NURSE PRACTITIONER

## 2023-05-04 PROCEDURE — 1036F TOBACCO NON-USER: CPT | Performed by: NURSE PRACTITIONER

## 2023-05-04 PROCEDURE — G8427 DOCREV CUR MEDS BY ELIG CLIN: HCPCS | Performed by: NURSE PRACTITIONER

## 2023-05-04 PROCEDURE — G8417 CALC BMI ABV UP PARAM F/U: HCPCS | Performed by: NURSE PRACTITIONER

## 2023-05-04 PROCEDURE — 87428 SARSCOV & INF VIR A&B AG IA: CPT | Performed by: NURSE PRACTITIONER

## 2023-05-04 RX ORDER — PREDNISONE 10 MG/1
10 TABLET ORAL 2 TIMES DAILY
Qty: 10 TABLET | Refills: 0 | Status: SHIPPED | OUTPATIENT
Start: 2023-05-04 | End: 2023-05-09

## 2023-05-04 RX ORDER — CEFDINIR 300 MG/1
300 CAPSULE ORAL 2 TIMES DAILY
Qty: 20 CAPSULE | Refills: 0 | Status: SHIPPED | OUTPATIENT
Start: 2023-05-04 | End: 2023-05-14

## 2023-05-04 RX ORDER — AZELASTINE 1 MG/ML
1 SPRAY, METERED NASAL 2 TIMES DAILY
Qty: 30 ML | Refills: 0 | Status: SHIPPED | OUTPATIENT
Start: 2023-05-04

## 2023-05-04 NOTE — PROGRESS NOTES
Chief Complaint:   Chest Congestion, Sinusitis, Otalgia (LEFT), and Generalized Body Aches      History of Present Illness   Source of history provided by:  patient. Bronson Vivar is a 65 y.o. old female with a past medical history of:   Past Medical History:   Diagnosis Date    Osteopenia         Pt presents to the [facility] care with a congestion/sinus pressure/ear fullness and body aches for the past several days. No  fever noted. Denies any N/V/D, abdominal pain, CP, progressive SOB, dizziness, or lethargy. ROS          Unless otherwise stated in this report or unable to obtain because of the patient's clinical or mental status as evidenced by the medical record, this patients's positive and negative responses for Review of Systems, constitutional, psych, eyes, ENT, cardiovascular, respiratory, gastrointestinal, neurological, genitourinary, musculoskeletal, integument systems and systems related to the presenting problem are either stated in the preceding or were not pertinent or were negative for the symptoms and/or complaints related to the medical problem. Past Surgical History:  has a past surgical history that includes Hysterectomy (2010); Foot neuroma surgery (Bilateral, 2012); Rotator cuff repair (2004, 2005); Cholecystectomy, laparoscopic; and Colonoscopy (N/A, 1/31/2023). Social History:  reports that she quit smoking about 11 years ago. Her smoking use included cigarettes. She has never used smokeless tobacco. She reports current alcohol use. She reports that she does not use drugs. Family History: family history includes Breast Cancer (age of onset: 72) in her maternal grandmother; No Known Problems in her brother, brother, and sister; Other in her father. Allergies: Patient has no known allergies.     Physical Exam         VS:  /84   Pulse 91   Temp 98.2 °F (36.8 °C) (Temporal)    Oxygen Saturation Interpretation: Normal.    Constitutional:  Alert, development

## 2023-05-08 ENCOUNTER — TELEPHONE (OUTPATIENT)
Dept: PRIMARY CARE CLINIC | Age: 63
End: 2023-05-08

## 2023-05-12 ENCOUNTER — OFFICE VISIT (OUTPATIENT)
Dept: PRIMARY CARE CLINIC | Age: 63
End: 2023-05-12
Payer: COMMERCIAL

## 2023-05-12 VITALS
TEMPERATURE: 99.1 F | WEIGHT: 191.4 LBS | BODY MASS INDEX: 29.01 KG/M2 | SYSTOLIC BLOOD PRESSURE: 128 MMHG | HEIGHT: 68 IN | DIASTOLIC BLOOD PRESSURE: 78 MMHG

## 2023-05-12 DIAGNOSIS — J32.8 OTHER CHRONIC SINUSITIS: Primary | ICD-10-CM

## 2023-05-12 DIAGNOSIS — J01.90 ACUTE BACTERIAL SINUSITIS: ICD-10-CM

## 2023-05-12 DIAGNOSIS — B96.89 ACUTE BACTERIAL SINUSITIS: ICD-10-CM

## 2023-05-12 DIAGNOSIS — J30.89 NON-SEASONAL ALLERGIC RHINITIS DUE TO OTHER ALLERGIC TRIGGER: ICD-10-CM

## 2023-05-12 DIAGNOSIS — G43.009 MIGRAINE WITHOUT AURA AND WITHOUT STATUS MIGRAINOSUS, NOT INTRACTABLE: ICD-10-CM

## 2023-05-12 PROBLEM — J30.9 ALLERGIC RHINITIS DUE TO ALLERGEN: Status: ACTIVE | Noted: 2023-05-12

## 2023-05-12 PROCEDURE — 99214 OFFICE O/P EST MOD 30 MIN: CPT | Performed by: FAMILY MEDICINE

## 2023-05-12 PROCEDURE — 1036F TOBACCO NON-USER: CPT | Performed by: FAMILY MEDICINE

## 2023-05-12 PROCEDURE — 3017F COLORECTAL CA SCREEN DOC REV: CPT | Performed by: FAMILY MEDICINE

## 2023-05-12 PROCEDURE — G8417 CALC BMI ABV UP PARAM F/U: HCPCS | Performed by: FAMILY MEDICINE

## 2023-05-12 PROCEDURE — G8428 CUR MEDS NOT DOCUMENT: HCPCS | Performed by: FAMILY MEDICINE

## 2023-05-12 RX ORDER — PREDNISONE 10 MG/1
TABLET ORAL
Qty: 18 TABLET | Refills: 0 | Status: SHIPPED | OUTPATIENT
Start: 2023-05-12

## 2023-05-12 RX ORDER — DOXYCYCLINE HYCLATE 100 MG
100 TABLET ORAL 2 TIMES DAILY
Qty: 20 TABLET | Refills: 0 | Status: SHIPPED | OUTPATIENT
Start: 2023-05-12 | End: 2023-05-22

## 2023-05-12 RX ORDER — AZELASTINE 1 MG/ML
1 SPRAY, METERED NASAL 2 TIMES DAILY
Qty: 30 ML | Refills: 3 | Status: SHIPPED | OUTPATIENT
Start: 2023-05-12

## 2023-05-12 ASSESSMENT — ENCOUNTER SYMPTOMS
EYES NEGATIVE: 1
ALLERGIC/IMMUNOLOGIC NEGATIVE: 1
GASTROINTESTINAL NEGATIVE: 1
SINUS PAIN: 1
SINUS PRESSURE: 1
RESPIRATORY NEGATIVE: 1

## 2023-05-12 ASSESSMENT — PATIENT HEALTH QUESTIONNAIRE - PHQ9
SUM OF ALL RESPONSES TO PHQ QUESTIONS 1-9: 0
1. LITTLE INTEREST OR PLEASURE IN DOING THINGS: 0
SUM OF ALL RESPONSES TO PHQ QUESTIONS 1-9: 0
2. FEELING DOWN, DEPRESSED OR HOPELESS: 0
SUM OF ALL RESPONSES TO PHQ9 QUESTIONS 1 & 2: 0
SUM OF ALL RESPONSES TO PHQ QUESTIONS 1-9: 0
SUM OF ALL RESPONSES TO PHQ QUESTIONS 1-9: 0

## 2023-05-12 NOTE — PROGRESS NOTES
counseling and coordinating care. Aggressive low-fat diet. Avoid red meats, greasy fried foods, dairy products. Avoid processed foods. Take cholesterol medications without food. I informed patient about the risk associated with noncompliance of medication and taking medications incorrectly. Appropriate follow-up with myself and all specialist.  Encourage family members to take active role in assisting with medications and medical care.   If any confusion should develop to notify my office immediately to avoid risk of worsening medical condition      Follow Up: Return for Reg Appt, See Referral.     Seen by:  Lana Carrizales DO

## 2023-06-03 ENCOUNTER — HOSPITAL ENCOUNTER (OUTPATIENT)
Dept: CT IMAGING | Age: 63
End: 2023-06-03
Payer: COMMERCIAL

## 2023-06-03 DIAGNOSIS — J32.8 OTHER CHRONIC SINUSITIS: ICD-10-CM

## 2023-06-03 PROCEDURE — 70486 CT MAXILLOFACIAL W/O DYE: CPT

## 2023-06-06 ENCOUNTER — TELEPHONE (OUTPATIENT)
Dept: PRIMARY CARE CLINIC | Age: 63
End: 2023-06-06

## 2023-06-06 DIAGNOSIS — J34.1 MAXILLARY SINUS CYST: ICD-10-CM

## 2023-06-06 DIAGNOSIS — J32.4 CHRONIC PANSINUSITIS: Primary | ICD-10-CM

## 2023-07-24 ENCOUNTER — OFFICE VISIT (OUTPATIENT)
Dept: ENT CLINIC | Age: 63
End: 2023-07-24
Payer: COMMERCIAL

## 2023-07-24 VITALS
WEIGHT: 194 LBS | SYSTOLIC BLOOD PRESSURE: 128 MMHG | HEIGHT: 68 IN | HEART RATE: 86 BPM | DIASTOLIC BLOOD PRESSURE: 86 MMHG | BODY MASS INDEX: 29.4 KG/M2

## 2023-07-24 DIAGNOSIS — G50.1 ATYPICAL FACIAL PAIN: Primary | ICD-10-CM

## 2023-07-24 PROCEDURE — 99203 OFFICE O/P NEW LOW 30 MIN: CPT | Performed by: OTOLARYNGOLOGY

## 2023-07-24 PROCEDURE — 3017F COLORECTAL CA SCREEN DOC REV: CPT | Performed by: OTOLARYNGOLOGY

## 2023-07-24 PROCEDURE — G8417 CALC BMI ABV UP PARAM F/U: HCPCS | Performed by: OTOLARYNGOLOGY

## 2023-07-24 PROCEDURE — G8427 DOCREV CUR MEDS BY ELIG CLIN: HCPCS | Performed by: OTOLARYNGOLOGY

## 2023-07-24 PROCEDURE — 1036F TOBACCO NON-USER: CPT | Performed by: OTOLARYNGOLOGY

## 2023-07-24 ASSESSMENT — ENCOUNTER SYMPTOMS
RHINORRHEA: 1
WHEEZING: 0
SORE THROAT: 0
TROUBLE SWALLOWING: 0
STRIDOR: 0
SINUS PRESSURE: 0
VOICE CHANGE: 0
SINUS PAIN: 0
GASTROINTESTINAL NEGATIVE: 1
CHOKING: 0
COLOR CHANGE: 0
COUGH: 0

## 2023-07-24 ASSESSMENT — VISUAL ACUITY: OU: 1

## 2023-07-24 NOTE — PROGRESS NOTES
[facility] Otolaryngology  Dr. Tanner Martini. NERI Linn Ms.Ed. New Consult       Patient Name:  Jared Dietz  :  1960     CHIEF C/O:    Chief Complaint   Patient presents with    New Patient     Maxillary sinus cyst, frequent sinus infections, headaches over the past year, CT in [facility],        HISTORY OBTAINED FROM:  patient    HISTORY OF PRESENT ILLNESS:       Corry Morrell is a 65y.o. year old female, here today for maxillary sinus cysts found on CT and frequent sinus infections. She reports her last sinus infection was in April. She was started on Astelin and has had no further infections since. She is mainly c/o headaches that occur daily. The location migrates from her face to her neck and can be bilateral or unilateral. Worse at night and in the morning. She was trialed on Topamax with no relief. Past Medical History:   Diagnosis Date    Migraine     Osteopenia      Past Surgical History:   Procedure Laterality Date    CHOLECYSTECTOMY, LAPAROSCOPIC      COLONOSCOPY N/A 2023    COLONOSCOPY WITH BIOPSY performed by Ashia Funez MD at [address] [facility]      [facility] ([address] [place] [address])      DR [address] [facility]  , 2005    RIGHT       Current Outpatient Medications:     Estradiol-Estriol-Progesterone (BIEST/PROGESTERONE) CREA, Place 0.5 mLs onto the skin daily 0.5 ML every morning, Disp: 60 g, Rfl: 11    azelastine (ASTELIN) 0.1 % nasal spray, 1 spray by Nasal route 2 times daily Use in each nostril as directed, Disp: 30 mL, Rfl: 3    Multiple Vitamins-Minerals (THERAPEUTIC MULTIVITAMIN-MINERALS) tablet, Take 1 tablet by mouth daily, Disp: , Rfl:     silver sulfADIAZINE (SILVADENE) 1 % cream, Apply topically daily. (Patient taking differently: daily as needed Apply topically daily.), Disp: 50 g, Rfl: 1  Patient has no known allergies.   Social History     Tobacco Use    Smoking status: Former     Types: Cigarettes     Quit

## 2023-07-25 ENCOUNTER — TELEPHONE (OUTPATIENT)
Dept: PRIMARY CARE CLINIC | Age: 63
End: 2023-07-25

## 2023-07-25 DIAGNOSIS — G43.009 MIGRAINE WITHOUT AURA AND WITHOUT STATUS MIGRAINOSUS, NOT INTRACTABLE: Primary | ICD-10-CM

## 2023-07-25 NOTE — TELEPHONE ENCOUNTER
Regarding: Headaches/ENT Visit  Contact: [phone]  ----- Message from Darryl Essex, LPN sent at 2/46/5453 11:16 AM EDT -----       ----- Message from Barbara Dietz to Lisaloli Kennedy DO sent at 7/24/2023 10:16 AM -----   Dr. Ronnie Monroy,    Dr. Kaylin Bravo said that the cysts are not causing my headaches and he does ot see anything that could be causing them. I had all of the allergy testing done and I am allergic to nothing. The allergist recommended  seeing an NP with [facility], in [facility], she is a headache specialist.  If you agree, can you do a referral for me or do I need to come back to see you?       Thank you,  Stefano Vo

## 2023-07-25 NOTE — TELEPHONE ENCOUNTER
Per Arianna Valladares in referrals, must be sent to [place] as she is a [employer] employee

## 2023-07-25 NOTE — TELEPHONE ENCOUNTER
Notify patient I put a referral in for nurse practitioner Paula Storm. If it takes too long to get in there let us know we will change it to [facility] clinic.   If symptoms are bad see me

## 2023-07-26 ENCOUNTER — TELEPHONE (OUTPATIENT)
Dept: PRIMARY CARE CLINIC | Age: 63
End: 2023-07-26

## 2023-07-26 DIAGNOSIS — G43.009 MIGRAINE WITHOUT AURA AND WITHOUT STATUS MIGRAINOSUS, NOT INTRACTABLE: Primary | ICD-10-CM

## 2023-08-03 ENCOUNTER — OFFICE VISIT (OUTPATIENT)
Dept: PRIMARY CARE CLINIC | Age: 63
End: 2023-08-03
Payer: COMMERCIAL

## 2023-08-03 ENCOUNTER — TELEPHONE (OUTPATIENT)
Dept: PRIMARY CARE CLINIC | Age: 63
End: 2023-08-03

## 2023-08-03 VITALS
BODY MASS INDEX: 30.07 KG/M2 | DIASTOLIC BLOOD PRESSURE: 83 MMHG | SYSTOLIC BLOOD PRESSURE: 135 MMHG | WEIGHT: 198.4 LBS | TEMPERATURE: 97.5 F | HEIGHT: 68 IN

## 2023-08-03 DIAGNOSIS — M54.2 CERVICALGIA: ICD-10-CM

## 2023-08-03 DIAGNOSIS — G43.009 MIGRAINE WITHOUT AURA AND WITHOUT STATUS MIGRAINOSUS, NOT INTRACTABLE: ICD-10-CM

## 2023-08-03 DIAGNOSIS — M47.812 SPONDYLOSIS OF CERVICAL REGION WITHOUT MYELOPATHY OR RADICULOPATHY: ICD-10-CM

## 2023-08-03 DIAGNOSIS — J33.8 NASAL SINUS POLYP: ICD-10-CM

## 2023-08-03 DIAGNOSIS — G43.009 MIGRAINE WITHOUT AURA AND WITHOUT STATUS MIGRAINOSUS, NOT INTRACTABLE: Primary | ICD-10-CM

## 2023-08-03 LAB
ALBUMIN SERPL-MCNC: 4.6 G/DL (ref 3.5–5.2)
ALP BLD-CCNC: 54 U/L (ref 35–104)
ALT SERPL-CCNC: 30 U/L (ref 0–32)
ANION GAP SERPL CALCULATED.3IONS-SCNC: 12 MMOL/L (ref 7–16)
AST SERPL-CCNC: 29 U/L (ref 0–31)
BILIRUB SERPL-MCNC: 0.8 MG/DL (ref 0–1.2)
BUN BLDV-MCNC: 14 MG/DL (ref 6–23)
CALCIUM SERPL-MCNC: 9.7 MG/DL (ref 8.6–10.2)
CHLORIDE BLD-SCNC: 106 MMOL/L (ref 98–107)
CO2: 25 MMOL/L (ref 22–29)
CREAT SERPL-MCNC: 0.8 MG/DL (ref 0.5–1)
GFR SERPL CREATININE-BSD FRML MDRD: >60 ML/MIN/1.73M2
GLUCOSE BLD-MCNC: 76 MG/DL (ref 74–99)
POTASSIUM SERPL-SCNC: 3.9 MMOL/L (ref 3.5–5)
SODIUM BLD-SCNC: 143 MMOL/L (ref 132–146)
TOTAL PROTEIN: 7.3 G/DL (ref 6.4–8.3)

## 2023-08-03 PROCEDURE — 3017F COLORECTAL CA SCREEN DOC REV: CPT | Performed by: FAMILY MEDICINE

## 2023-08-03 PROCEDURE — G8417 CALC BMI ABV UP PARAM F/U: HCPCS | Performed by: FAMILY MEDICINE

## 2023-08-03 PROCEDURE — G8428 CUR MEDS NOT DOCUMENT: HCPCS | Performed by: FAMILY MEDICINE

## 2023-08-03 PROCEDURE — 1036F TOBACCO NON-USER: CPT | Performed by: FAMILY MEDICINE

## 2023-08-03 PROCEDURE — 99214 OFFICE O/P EST MOD 30 MIN: CPT | Performed by: FAMILY MEDICINE

## 2023-08-03 RX ORDER — CYCLOBENZAPRINE HCL 10 MG
10 TABLET ORAL 3 TIMES DAILY PRN
Qty: 90 TABLET | Refills: 5 | Status: SHIPPED | OUTPATIENT
Start: 2023-08-03

## 2023-08-03 SDOH — ECONOMIC STABILITY: INCOME INSECURITY: HOW HARD IS IT FOR YOU TO PAY FOR THE VERY BASICS LIKE FOOD, HOUSING, MEDICAL CARE, AND HEATING?: NOT HARD AT ALL

## 2023-08-03 SDOH — ECONOMIC STABILITY: HOUSING INSECURITY
IN THE LAST 12 MONTHS, WAS THERE A TIME WHEN YOU DID NOT HAVE A STEADY PLACE TO SLEEP OR SLEPT IN A SHELTER (INCLUDING NOW)?: NO

## 2023-08-03 SDOH — ECONOMIC STABILITY: FOOD INSECURITY: WITHIN THE PAST 12 MONTHS, YOU WORRIED THAT YOUR FOOD WOULD RUN OUT BEFORE YOU GOT MONEY TO BUY MORE.: NEVER TRUE

## 2023-08-03 SDOH — ECONOMIC STABILITY: TRANSPORTATION INSECURITY
IN THE PAST 12 MONTHS, HAS LACK OF TRANSPORTATION KEPT YOU FROM MEETINGS, WORK, OR FROM GETTING THINGS NEEDED FOR DAILY LIVING?: NO

## 2023-08-03 SDOH — ECONOMIC STABILITY: FOOD INSECURITY: WITHIN THE PAST 12 MONTHS, THE FOOD YOU BOUGHT JUST DIDN'T LAST AND YOU DIDN'T HAVE MONEY TO GET MORE.: NEVER TRUE

## 2023-08-03 ASSESSMENT — PATIENT HEALTH QUESTIONNAIRE - PHQ9
1. LITTLE INTEREST OR PLEASURE IN DOING THINGS: 0
SUM OF ALL RESPONSES TO PHQ QUESTIONS 1-9: 0
2. FEELING DOWN, DEPRESSED OR HOPELESS: 0
SUM OF ALL RESPONSES TO PHQ9 QUESTIONS 1 & 2: 0
SUM OF ALL RESPONSES TO PHQ QUESTIONS 1-9: 0

## 2023-08-03 ASSESSMENT — ENCOUNTER SYMPTOMS
RESPIRATORY NEGATIVE: 1
GASTROINTESTINAL NEGATIVE: 1
ALLERGIC/IMMUNOLOGIC NEGATIVE: 1
EYES NEGATIVE: 1

## 2023-08-03 NOTE — PROGRESS NOTES
8/3/23  Name: Robbi Dietz    : 1960    Sex: female    Age: 65 y.o. Subjective:  Chief Complaint: Patient is here for  headache     Saw ent and advised  svobda and  not able to get in until  dec  She was seen in past   and did not liek the neuro  She   qit taking flexer for long time  ha may have started when she quit the felxeril---------- she gretel tto try  flexeril again      Review of Systems   Constitutional: Negative. HENT: Negative. Eyes: Negative. Respiratory: Negative. Cardiovascular: Negative. Gastrointestinal: Negative. Endocrine: Negative. Genitourinary: Negative. Musculoskeletal: Negative. Skin: Negative. Allergic/Immunologic: Negative. Neurological:  Positive for headaches. Hematological: Negative. Psychiatric/Behavioral: Negative. Current Outpatient Medications:     Rimegepant Sulfate 75 MG TBDP, One every other day maintenance and extra one in between if headache, Disp: 30 tablet, Rfl: 5    cyclobenzaprine (FLEXERIL) 10 MG tablet, Take 1 tablet by mouth 3 times daily as needed for Muscle spasms Tired do not drive, Disp: 90 tablet, Rfl: 5    Estradiol-Estriol-Progesterone (BIEST/PROGESTERONE) CREA, Place 0.5 mLs onto the skin daily 0.5 ML every morning, Disp: 60 g, Rfl: 11    azelastine (ASTELIN) 0.1 % nasal spray, 1 spray by Nasal route 2 times daily Use in each nostril as directed, Disp: 30 mL, Rfl: 3    Multiple Vitamins-Minerals (THERAPEUTIC MULTIVITAMIN-MINERALS) tablet, Take 1 tablet by mouth daily, Disp: , Rfl:     silver sulfADIAZINE (SILVADENE) 1 % cream, Apply topically daily.  (Patient taking differently: daily as needed Apply topically daily.), Disp: 50 g, Rfl: 1  No Known Allergies  Social History     Socioeconomic History    Marital status:      Spouse name: Not on file    Number of children: Not on file    Years of education: Not on file    Highest education level: Not on file   Occupational History    Occupation:

## 2023-08-03 NOTE — TELEPHONE ENCOUNTER
----- Message from Hayes Butcher sent at 8/3/2023  9:14 AM EDT -----  Subject: Message to Provider    QUESTIONS  Information for Provider? Patient would like an appointment for her   migraine headaches, said has been seen within the past 14 days. Dr. Harvinder Rendon   ---------------------------------------------------------------------------  --------------  [facility]  [phone]; OK to leave message on voicemail  ---------------------------------------------------------------------------  --------------  SCRIPT ANSWERS  Relationship to Patient? Self  Would you describe this as the worst headache of your life? No  Are you having fevers (100.4), chills or sweats, or vomiting? No  Are you having weakness on one side of your body, drooping on one side of   your face or difficult speaking? No  Have you had any injuries to your head? No  Have you recently (14 days) seen a provider for this issue?  Yes

## 2023-08-04 ENCOUNTER — TELEPHONE (OUTPATIENT)
Dept: PRIMARY CARE CLINIC | Age: 63
End: 2023-08-04

## 2023-08-04 LAB — SEDIMENTATION RATE, ERYTHROCYTE: 10 MM/HR (ref 0–20)

## 2023-08-08 ENCOUNTER — TELEPHONE (OUTPATIENT)
Dept: PRIMARY CARE CLINIC | Age: 63
End: 2023-08-08

## 2023-08-08 DIAGNOSIS — G43.009 MIGRAINE WITHOUT AURA AND WITHOUT STATUS MIGRAINOSUS, NOT INTRACTABLE: Primary | ICD-10-CM

## 2023-08-08 RX ORDER — SUMATRIPTAN 100 MG/1
TABLET, FILM COATED ORAL
Qty: 27 TABLET | Refills: 5 | Status: SHIPPED | OUTPATIENT
Start: 2023-08-08

## 2023-08-08 NOTE — TELEPHONE ENCOUNTER
Insurance denied the [employer]. Must try two triptans before they will pay. Has tried Maxalt in the past.  The Topamax that she tried is NOT a triptan. Pt said she would try anything Dr. Lubna Yang suggests.

## 2023-08-08 NOTE — TELEPHONE ENCOUNTER
Notify patient I sent another triptan for the drugstore.   When she tries out amd if  does not work we can try to prior auth [facility] again

## 2023-08-13 ENCOUNTER — HOSPITAL ENCOUNTER (OUTPATIENT)
Dept: MRI IMAGING | Age: 63
Discharge: HOME | End: 2023-08-15
Payer: COMMERCIAL

## 2023-08-13 DIAGNOSIS — G43.009 MIGRAINE WITHOUT AURA AND WITHOUT STATUS MIGRAINOSUS, NOT INTRACTABLE: ICD-10-CM

## 2023-08-13 PROCEDURE — 6360000004 HC RX CONTRAST MEDICATION: Performed by: RADIOLOGY

## 2023-08-13 PROCEDURE — 70553 MRI BRAIN STEM W/O & W/DYE: CPT

## 2023-08-13 PROCEDURE — A9577 INJ MULTIHANCE: HCPCS | Performed by: RADIOLOGY

## 2023-08-13 RX ADMIN — GADOBENATE DIMEGLUMINE 19 ML: 529 INJECTION, SOLUTION INTRAVENOUS at 09:51

## 2023-08-14 ENCOUNTER — TELEPHONE (OUTPATIENT)
Dept: PRIMARY CARE CLINIC | Age: 63
End: 2023-08-14

## 2023-08-14 NOTE — TELEPHONE ENCOUNTER
Notify mri brain ok   mthere is mild maxiallary sinus polyps    should nnot be causing symptoms but may need ent in future

## 2023-08-15 NOTE — TELEPHONE ENCOUNTER
Tell patient since she tried and it does not work we could try to prior Auth for Nurtec.again-----go ahead and try the prior saying that Imitrex did not help.

## 2023-08-28 ENCOUNTER — TELEPHONE (OUTPATIENT)
Dept: PRIMARY CARE CLINIC | Age: 63
End: 2023-08-28

## 2023-08-28 NOTE — TELEPHONE ENCOUNTER
Regarding: FW: Headaches/ENT Visit  Contact: [phone]    ----- Message -----  From: Marcus Lin  Sent: 8/24/2023  11:38 AM EDT  To: Sergio ARELLANO [address] Clinical Staff  Subject: Headaches/ENT Visit                              I spoke With Ghislaine last week. The imitrex is not working - I have taken 8 of them. She said you were going to try to get Nurtec approved again since I have tried 2 triptans. Was I supposed To do something to follow up? Thank you!   Ava Puga

## 2023-09-06 ENCOUNTER — TELEPHONE (OUTPATIENT)
Dept: PRIMARY CARE CLINIC | Age: 63
End: 2023-09-06

## 2023-09-06 NOTE — TELEPHONE ENCOUNTER
LM for pt to call, need to know if she's had and N&V or photophobia due to her headaches.   (Working on her Nurtec prior auth and her insurance called me about that.)

## 2023-09-06 NOTE — TELEPHONE ENCOUNTER
Prior auth for [facility]. Waiting on information to complete prior auth.   Advised him we're waiting to hear back from patient

## 2023-09-07 NOTE — TELEPHONE ENCOUNTER
Spoke to Kelly Braden, a pharmacist with [facility] earlier in the week, trying to answer some questions for him regarding the Nurte authorization. He wanted to know if the pt had any N&V or photophobia with her migraines. I didn't see mention of it in her chart but I called her and she says yes to both things. I then LM for Kelly Braden to notify today.

## 2023-10-12 LAB — MAMMOGRAPHY, EXTERNAL: NORMAL

## 2023-10-23 ENCOUNTER — OFFICE VISIT (OUTPATIENT)
Dept: PRIMARY CARE CLINIC | Age: 63
End: 2023-10-23
Payer: COMMERCIAL

## 2023-10-23 VITALS
BODY MASS INDEX: 29.86 KG/M2 | HEIGHT: 68 IN | TEMPERATURE: 98.5 F | WEIGHT: 197 LBS | DIASTOLIC BLOOD PRESSURE: 82 MMHG | SYSTOLIC BLOOD PRESSURE: 135 MMHG

## 2023-10-23 DIAGNOSIS — H26.493 OTHER SECONDARY CATARACT OF BOTH EYES: ICD-10-CM

## 2023-10-23 DIAGNOSIS — Z01.818 PRE-OP EXAMINATION: Primary | ICD-10-CM

## 2023-10-23 DIAGNOSIS — G43.009 MIGRAINE WITHOUT AURA AND WITHOUT STATUS MIGRAINOSUS, NOT INTRACTABLE: ICD-10-CM

## 2023-10-23 DIAGNOSIS — E88.819 INSULIN RESISTANCE: Chronic | ICD-10-CM

## 2023-10-23 PROBLEM — H26.40 SECONDARY CATARACT: Status: ACTIVE | Noted: 2023-10-23

## 2023-10-23 PROCEDURE — 1036F TOBACCO NON-USER: CPT | Performed by: FAMILY MEDICINE

## 2023-10-23 PROCEDURE — 99214 OFFICE O/P EST MOD 30 MIN: CPT | Performed by: FAMILY MEDICINE

## 2023-10-23 PROCEDURE — 3017F COLORECTAL CA SCREEN DOC REV: CPT | Performed by: FAMILY MEDICINE

## 2023-10-23 PROCEDURE — G8417 CALC BMI ABV UP PARAM F/U: HCPCS | Performed by: FAMILY MEDICINE

## 2023-10-23 PROCEDURE — G8484 FLU IMMUNIZE NO ADMIN: HCPCS | Performed by: FAMILY MEDICINE

## 2023-10-23 PROCEDURE — G8428 CUR MEDS NOT DOCUMENT: HCPCS | Performed by: FAMILY MEDICINE

## 2023-10-23 SDOH — ECONOMIC STABILITY: INCOME INSECURITY: HOW HARD IS IT FOR YOU TO PAY FOR THE VERY BASICS LIKE FOOD, HOUSING, MEDICAL CARE, AND HEATING?: NOT HARD AT ALL

## 2023-10-23 SDOH — ECONOMIC STABILITY: FOOD INSECURITY: WITHIN THE PAST 12 MONTHS, THE FOOD YOU BOUGHT JUST DIDN'T LAST AND YOU DIDN'T HAVE MONEY TO GET MORE.: NEVER TRUE

## 2023-10-23 SDOH — ECONOMIC STABILITY: FOOD INSECURITY: WITHIN THE PAST 12 MONTHS, YOU WORRIED THAT YOUR FOOD WOULD RUN OUT BEFORE YOU GOT MONEY TO BUY MORE.: NEVER TRUE

## 2023-10-23 ASSESSMENT — ENCOUNTER SYMPTOMS
ALLERGIC/IMMUNOLOGIC NEGATIVE: 1
EYES NEGATIVE: 1
RESPIRATORY NEGATIVE: 1
GASTROINTESTINAL NEGATIVE: 1

## 2023-10-23 ASSESSMENT — PATIENT HEALTH QUESTIONNAIRE - PHQ9
SUM OF ALL RESPONSES TO PHQ9 QUESTIONS 1 & 2: 0
2. FEELING DOWN, DEPRESSED OR HOPELESS: 0
SUM OF ALL RESPONSES TO PHQ QUESTIONS 1-9: 0
1. LITTLE INTEREST OR PLEASURE IN DOING THINGS: 0
SUM OF ALL RESPONSES TO PHQ QUESTIONS 1-9: 0

## 2024-01-31 DIAGNOSIS — M54.2 CERVICALGIA: ICD-10-CM

## 2024-01-31 RX ORDER — CYCLOBENZAPRINE HCL 10 MG
10 TABLET ORAL 3 TIMES DAILY PRN
Qty: 90 TABLET | Refills: 5 | Status: SHIPPED | OUTPATIENT
Start: 2024-01-31

## 2024-01-31 NOTE — TELEPHONE ENCOUNTER
Patients last appointment 10/23/2023.  Patients next scheduled appointment   Future Appointments   Date Time Provider Department Center   10/17/2024  8:00 AM Nhi Méndez DO [facility]

## 2024-03-04 DIAGNOSIS — B96.89 ACUTE BACTERIAL SINUSITIS: ICD-10-CM

## 2024-03-04 DIAGNOSIS — J01.90 ACUTE BACTERIAL SINUSITIS: ICD-10-CM

## 2024-03-04 RX ORDER — AZELASTINE 1 MG/ML
1 SPRAY, METERED NASAL 2 TIMES DAILY
Qty: 30 ML | Refills: 3 | Status: SHIPPED | OUTPATIENT
Start: 2024-03-04

## 2024-03-04 NOTE — TELEPHONE ENCOUNTER
Patients last appointment 10/23/2023.  Patients next scheduled appointment   Future Appointments   Date Time Provider Department Center   10/17/2024  8:00 AM Nhi Méndez, DO [facility]        Do you want to see patient?

## 2024-04-09 ENCOUNTER — OFFICE VISIT (OUTPATIENT)
Dept: PODIATRY | Age: 64
End: 2024-04-09
Payer: COMMERCIAL

## 2024-04-09 VITALS
DIASTOLIC BLOOD PRESSURE: 70 MMHG | WEIGHT: 197 LBS | SYSTOLIC BLOOD PRESSURE: 126 MMHG | TEMPERATURE: 97.9 F | BODY MASS INDEX: 29.96 KG/M2

## 2024-04-09 DIAGNOSIS — M79.675 PAIN IN LEFT TOE(S): ICD-10-CM

## 2024-04-09 DIAGNOSIS — L97.521 SKIN ULCER OF LEFT GREAT TOE, LIMITED TO BREAKDOWN OF SKIN (HCC): Primary | ICD-10-CM

## 2024-04-09 PROCEDURE — 99213 OFFICE O/P EST LOW 20 MIN: CPT | Performed by: PODIATRIST

## 2024-04-09 PROCEDURE — G8427 DOCREV CUR MEDS BY ELIG CLIN: HCPCS | Performed by: PODIATRIST

## 2024-04-09 PROCEDURE — 1036F TOBACCO NON-USER: CPT | Performed by: PODIATRIST

## 2024-04-09 PROCEDURE — G8417 CALC BMI ABV UP PARAM F/U: HCPCS | Performed by: PODIATRIST

## 2024-04-09 PROCEDURE — 3017F COLORECTAL CA SCREEN DOC REV: CPT | Performed by: PODIATRIST

## 2024-04-09 RX ORDER — MELOXICAM 15 MG/1
15 TABLET ORAL DAILY
Qty: 60 TABLET | Refills: 0 | Status: SHIPPED | OUTPATIENT
Start: 2024-04-09 | End: 2024-06-08

## 2024-04-09 RX ORDER — CEPHALEXIN 500 MG/1
500 CAPSULE ORAL 2 TIMES DAILY
Qty: 20 CAPSULE | Refills: 0 | Status: SHIPPED | OUTPATIENT
Start: 2024-04-09 | End: 2024-04-19

## 2024-04-09 NOTE — PROGRESS NOTES
24  Rebecca Baermatthew : 1960 Sex: female  Age: 63 y.o.    Patient was referred by Gilbert Rabago DO    Chief Complaint   Patient presents with    Blister    Toe Pain     Pt called yesterday her left great toe is bothering her again  She has been using Advil, Silvadene and Lidocaine cream   She is going on a trip wanted it looked at   Gilbert Rabago DO  2024       SUBJECTIVE patient is seen today as an emergency her left great toe started to get sore again.  No new trauma.  Patient is leaving on Tuesday of next week for [place] patient denies fever chills or night sweats.  Toe Pain       Review of Systems  Const: Denies constitutional symptoms  Musculo: Denies symptoms other than stated above  Skin: Denies symptoms other than stated above       Current Outpatient Medications:     cephALEXin (KEFLEX) 500 MG capsule, Take 1 capsule by mouth 2 times daily for 10 days, Disp: 20 capsule, Rfl: 0    meloxicam (MOBIC) 15 MG tablet, Take 1 tablet by mouth daily, Disp: 60 tablet, Rfl: 0    azelastine (ASTELIN) 0.1 % nasal spray, 1 spray by Nasal route 2 times daily Use in each nostril as directed, Disp: 30 mL, Rfl: 3    cyclobenzaprine (FLEXERIL) 10 MG tablet, Take 1 tablet by mouth 3 times daily as needed for Muscle spasms Tired do not drive, Disp: 90 tablet, Rfl: 5    Rimegepant Sulfate 75 MG TBDP, One every other day maintenance and extra one in between if headache, Disp: 30 tablet, Rfl: 5    Estradiol-Estriol-Progesterone (BIEST/PROGESTERONE) CREA, Place 0.5 mLs onto the skin daily 0.5 ML every morning, Disp: 60 g, Rfl: 11    Multiple Vitamins-Minerals (THERAPEUTIC MULTIVITAMIN-MINERALS) tablet, Take 1 tablet by mouth daily, Disp: , Rfl:     SUMAtriptan (IMITREX) 100 MG tablet, One As needed headache. Max two per day and six per week, Disp: 27 tablet, Rfl: 5  No Known Allergies    Past Medical History:   Diagnosis Date    Migraine     Osteopenia      Past Surgical History:   Procedure Laterality

## 2024-05-04 ENCOUNTER — OFFICE VISIT (OUTPATIENT)
Dept: PODIATRY | Age: 64
End: 2024-05-04
Payer: COMMERCIAL

## 2024-05-04 VITALS
WEIGHT: 181 LBS | SYSTOLIC BLOOD PRESSURE: 121 MMHG | DIASTOLIC BLOOD PRESSURE: 80 MMHG | BODY MASS INDEX: 27.53 KG/M2 | TEMPERATURE: 98.1 F

## 2024-05-04 DIAGNOSIS — L97.521 SKIN ULCER OF LEFT GREAT TOE, LIMITED TO BREAKDOWN OF SKIN (HCC): Primary | ICD-10-CM

## 2024-05-04 PROCEDURE — 3017F COLORECTAL CA SCREEN DOC REV: CPT | Performed by: PODIATRIST

## 2024-05-04 PROCEDURE — 99212 OFFICE O/P EST SF 10 MIN: CPT | Performed by: PODIATRIST

## 2024-05-04 PROCEDURE — G8427 DOCREV CUR MEDS BY ELIG CLIN: HCPCS | Performed by: PODIATRIST

## 2024-05-04 PROCEDURE — G8417 CALC BMI ABV UP PARAM F/U: HCPCS | Performed by: PODIATRIST

## 2024-05-04 PROCEDURE — 1036F TOBACCO NON-USER: CPT | Performed by: PODIATRIST

## 2024-05-04 NOTE — PROGRESS NOTES
24  Rebecca Dietz : 1960 Sex: female  Age: 63 y.o.    Patient was referred by Gilbert Rabago DO    Chief Complaint   Patient presents with    Wound Check    Foot Ulcer     Patient is here today for follow up skin ulcer of the left great toe. Patient states improvement and no new concerns. Gilbert Rabago DO seen 10/23/2024       SUBJECTIVE patient is seen today for follow-up of an ulcer to the left great toe she is doing very good with the healing  HPI  Review of Systems  Const: Denies constitutional symptoms  Musculo: Denies symptoms other than stated above  Skin: Denies symptoms other than stated above       Current Outpatient Medications:     meloxicam (MOBIC) 15 MG tablet, Take 1 tablet by mouth daily, Disp: 60 tablet, Rfl: 0    azelastine (ASTELIN) 0.1 % nasal spray, 1 spray by Nasal route 2 times daily Use in each nostril as directed, Disp: 30 mL, Rfl: 3    cyclobenzaprine (FLEXERIL) 10 MG tablet, Take 1 tablet by mouth 3 times daily as needed for Muscle spasms Tired do not drive, Disp: 90 tablet, Rfl: 5    Rimegepant Sulfate 75 MG TBDP, One every other day maintenance and extra one in between if headache, Disp: 30 tablet, Rfl: 5    Estradiol-Estriol-Progesterone (BIEST/PROGESTERONE) CREA, Place 0.5 mLs onto the skin daily 0.5 ML every morning, Disp: 60 g, Rfl: 11    Multiple Vitamins-Minerals (THERAPEUTIC MULTIVITAMIN-MINERALS) tablet, Take 1 tablet by mouth daily, Disp: , Rfl:   No Known Allergies    Past Medical History:   Diagnosis Date    Migraine     Osteopenia      Past Surgical History:   Procedure Laterality Date    CHOLECYSTECTOMY, LAPAROSCOPIC      COLONOSCOPY N/A 2023    COLONOSCOPY WITH BIOPSY performed by Haja Deras MD at [facility] ENDOSCOPY    FOOT NEUROMA SURGERY Bilateral     DR PELAEZ    HYSTERECTOMY (CERVIX STATUS UNKNOWN)      DR ELDRIDGE    ROTATOR CUFF REPAIR  ,     RIGHT     Family History   Problem Relation Age of Onset    Other Father

## 2024-07-18 DIAGNOSIS — M54.2 CERVICALGIA: ICD-10-CM

## 2024-07-18 RX ORDER — CYCLOBENZAPRINE HCL 10 MG
10 TABLET ORAL 3 TIMES DAILY PRN
Qty: 90 TABLET | Refills: 5 | OUTPATIENT
Start: 2024-07-18

## 2024-07-31 ENCOUNTER — OFFICE VISIT (OUTPATIENT)
Dept: PRIMARY CARE CLINIC | Age: 64
End: 2024-07-31
Payer: COMMERCIAL

## 2024-07-31 VITALS
OXYGEN SATURATION: 97 % | WEIGHT: 179.8 LBS | HEART RATE: 74 BPM | DIASTOLIC BLOOD PRESSURE: 78 MMHG | RESPIRATION RATE: 17 BRPM | SYSTOLIC BLOOD PRESSURE: 128 MMHG | TEMPERATURE: 97.8 F | BODY MASS INDEX: 27.34 KG/M2

## 2024-07-31 DIAGNOSIS — E88.819 INSULIN RESISTANCE: ICD-10-CM

## 2024-07-31 DIAGNOSIS — Z00.01 ENCOUNTER FOR ANNUAL GENERAL MEDICAL EXAMINATION WITH ABNORMAL FINDINGS IN ADULT: Primary | ICD-10-CM

## 2024-07-31 DIAGNOSIS — M54.2 CERVICALGIA: ICD-10-CM

## 2024-07-31 DIAGNOSIS — K21.9 GASTROESOPHAGEAL REFLUX DISEASE WITHOUT ESOPHAGITIS: ICD-10-CM

## 2024-07-31 DIAGNOSIS — E53.8 VITAMIN B 12 DEFICIENCY: ICD-10-CM

## 2024-07-31 DIAGNOSIS — E03.9 ACQUIRED HYPOTHYROIDISM: ICD-10-CM

## 2024-07-31 DIAGNOSIS — G43.009 MIGRAINE WITHOUT AURA AND WITHOUT STATUS MIGRAINOSUS, NOT INTRACTABLE: ICD-10-CM

## 2024-07-31 DIAGNOSIS — R73.01 IMPAIRED FASTING GLUCOSE: ICD-10-CM

## 2024-07-31 DIAGNOSIS — E55.9 VITAMIN D DEFICIENCY: ICD-10-CM

## 2024-07-31 PROCEDURE — 99396 PREV VISIT EST AGE 40-64: CPT | Performed by: FAMILY MEDICINE

## 2024-07-31 RX ORDER — CYCLOBENZAPRINE HCL 10 MG
10 TABLET ORAL 3 TIMES DAILY PRN
Qty: 270 TABLET | Refills: 5 | Status: SHIPPED | OUTPATIENT
Start: 2024-07-31

## 2024-07-31 SDOH — ECONOMIC STABILITY: FOOD INSECURITY: WITHIN THE PAST 12 MONTHS, THE FOOD YOU BOUGHT JUST DIDN'T LAST AND YOU DIDN'T HAVE MONEY TO GET MORE.: NEVER TRUE

## 2024-07-31 SDOH — ECONOMIC STABILITY: INCOME INSECURITY: HOW HARD IS IT FOR YOU TO PAY FOR THE VERY BASICS LIKE FOOD, HOUSING, MEDICAL CARE, AND HEATING?: NOT HARD AT ALL

## 2024-07-31 SDOH — ECONOMIC STABILITY: FOOD INSECURITY: WITHIN THE PAST 12 MONTHS, YOU WORRIED THAT YOUR FOOD WOULD RUN OUT BEFORE YOU GOT MONEY TO BUY MORE.: NEVER TRUE

## 2024-07-31 ASSESSMENT — PATIENT HEALTH QUESTIONNAIRE - PHQ9
2. FEELING DOWN, DEPRESSED OR HOPELESS: NOT AT ALL
1. LITTLE INTEREST OR PLEASURE IN DOING THINGS: NOT AT ALL
SUM OF ALL RESPONSES TO PHQ QUESTIONS 1-9: 0
SUM OF ALL RESPONSES TO PHQ9 QUESTIONS 1 & 2: 0
SUM OF ALL RESPONSES TO PHQ9 QUESTIONS 1 & 2: 0
1. LITTLE INTEREST OR PLEASURE IN DOING THINGS: NOT AT ALL
SUM OF ALL RESPONSES TO PHQ QUESTIONS 1-9: 0
2. FEELING DOWN, DEPRESSED OR HOPELESS: NOT AT ALL
SUM OF ALL RESPONSES TO PHQ QUESTIONS 1-9: 0
SUM OF ALL RESPONSES TO PHQ QUESTIONS 1-9: 0

## 2024-07-31 ASSESSMENT — ENCOUNTER SYMPTOMS
EYES NEGATIVE: 1
GASTROINTESTINAL NEGATIVE: 1
ALLERGIC/IMMUNOLOGIC NEGATIVE: 1
RESPIRATORY NEGATIVE: 1

## 2024-07-31 NOTE — PROGRESS NOTES
MD [place] at [facility] ENDOSCOPY    FOOT NEUROMA SURGERY Bilateral 2012    DR PELAEZ    HYSTERECTOMY (CERVIX STATUS UNKNOWN)  2010    DR ELDRIDGE    ROTATOR CUFF REPAIR  2004, 2005    RIGHT      Vitals:    07/31/24 0919   BP: 128/78   Pulse: 74   Resp: 17   Temp: 97.8 °F (36.6 °C)   TempSrc: Temporal   SpO2: 97%   Weight: 81.6 kg (179 lb 12.8 oz)       Objective:    Physical Exam  Vitals reviewed.   Constitutional:       Appearance: Normal appearance. She is well-developed.   HENT:      Head: Normocephalic.      Right Ear: Tympanic membrane normal.      Left Ear: Tympanic membrane normal.      Nose: Nose normal.      Mouth/Throat:      Mouth: Mucous membranes are moist.   Eyes:      Pupils: Pupils are equal, round, and reactive to light.   Cardiovascular:      Rate and Rhythm: Normal rate and regular rhythm.   Pulmonary:      Effort: Pulmonary effort is normal.      Breath sounds: Normal breath sounds.   Abdominal:      General: Bowel sounds are normal.      Palpations: Abdomen is soft.   Musculoskeletal:         General: Normal range of motion.      Cervical back: Normal range of motion.   Skin:     General: Skin is warm.   Neurological:      Mental Status: She is alert and oriented to person, place, and time.   Psychiatric:         Behavior: Behavior normal.         Rebecca was seen today for medication refill, follow-up and gastroesophageal reflux.    Diagnoses and all orders for this visit:    Encounter for annual general medical examination with abnormal findings in adult    Cervicalgia  -     cyclobenzaprine (FLEXERIL) 10 MG tablet; Take 1 tablet by mouth 3 times daily as needed for Muscle spasms Tired do not drive    Migraine without aura and without status migrainosus, not intractable    Gastroesophageal reflux disease without esophagitis  -     External Referral To Gastroenterology        Comments: refil med hm issues  Appt gi  I educated the patient about all medications.  Make sure they were correct and

## 2024-08-08 ENCOUNTER — HOSPITAL ENCOUNTER (OUTPATIENT)
Age: 64
Discharge: HOME | End: 2024-08-08
Payer: COMMERCIAL

## 2024-08-08 DIAGNOSIS — E88.819 INSULIN RESISTANCE: ICD-10-CM

## 2024-08-08 DIAGNOSIS — E53.8 VITAMIN B 12 DEFICIENCY: ICD-10-CM

## 2024-08-08 DIAGNOSIS — Z00.01 ENCOUNTER FOR ANNUAL GENERAL MEDICAL EXAMINATION WITH ABNORMAL FINDINGS IN ADULT: ICD-10-CM

## 2024-08-08 DIAGNOSIS — E03.9 ACQUIRED HYPOTHYROIDISM: ICD-10-CM

## 2024-08-08 DIAGNOSIS — E55.9 VITAMIN D DEFICIENCY: ICD-10-CM

## 2024-08-08 DIAGNOSIS — R73.01 IMPAIRED FASTING GLUCOSE: ICD-10-CM

## 2024-08-08 LAB
25(OH)D3 SERPL-MCNC: 233.4 NG/ML (ref 30–100)
ALBUMIN SERPL-MCNC: 4.5 G/DL (ref 3.5–5.2)
ALP SERPL-CCNC: 54 U/L (ref 35–104)
ALT SERPL-CCNC: 29 U/L (ref 0–32)
ANION GAP SERPL CALCULATED.3IONS-SCNC: 11 MMOL/L (ref 7–16)
AST SERPL-CCNC: 23 U/L (ref 0–31)
BACTERIA URNS QL MICRO: ABNORMAL
BASOPHILS # BLD: 0.02 K/UL (ref 0–0.2)
BASOPHILS NFR BLD: 0 % (ref 0–2)
BILIRUB SERPL-MCNC: 0.9 MG/DL (ref 0–1.2)
BILIRUB UR QL STRIP: NEGATIVE
BUN SERPL-MCNC: 16 MG/DL (ref 6–23)
CALCIUM SERPL-MCNC: 10.1 MG/DL (ref 8.6–10.2)
CHLORIDE SERPL-SCNC: 104 MMOL/L (ref 98–107)
CHOLEST SERPL-MCNC: 216 MG/DL
CLARITY UR: CLEAR
CO2 SERPL-SCNC: 26 MMOL/L (ref 22–29)
COLOR UR: YELLOW
CREAT SERPL-MCNC: 1 MG/DL (ref 0.5–1)
EOSINOPHIL # BLD: 0.27 K/UL (ref 0.05–0.5)
EOSINOPHILS RELATIVE PERCENT: 6 % (ref 0–6)
EPI CELLS #/AREA URNS HPF: ABNORMAL /HPF
ERYTHROCYTE [DISTWIDTH] IN BLOOD BY AUTOMATED COUNT: 13.6 % (ref 11.5–15)
GFR, ESTIMATED: 61 ML/MIN/1.73M2
GLUCOSE SERPL-MCNC: 85 MG/DL (ref 74–99)
GLUCOSE UR STRIP-MCNC: NEGATIVE MG/DL
HBA1C MFR BLD: 4.5 % (ref 4–5.6)
HCT VFR BLD AUTO: 43.2 % (ref 34–48)
HDLC SERPL-MCNC: 61 MG/DL
HGB BLD-MCNC: 14.7 G/DL (ref 11.5–15.5)
HGB UR QL STRIP.AUTO: NEGATIVE
IMM GRANULOCYTES # BLD AUTO: <0.03 K/UL (ref 0–0.58)
IMM GRANULOCYTES NFR BLD: 0 % (ref 0–5)
KETONES UR STRIP-MCNC: ABNORMAL MG/DL
LDLC SERPL CALC-MCNC: 132 MG/DL
LEUKOCYTE ESTERASE UR QL STRIP: ABNORMAL
LYMPHOCYTES NFR BLD: 0.88 K/UL (ref 1.5–4)
LYMPHOCYTES RELATIVE PERCENT: 18 % (ref 20–42)
MCH RBC QN AUTO: 31.2 PG (ref 26–35)
MCHC RBC AUTO-ENTMCNC: 34 G/DL (ref 32–34.5)
MCV RBC AUTO: 91.7 FL (ref 80–99.9)
MONOCYTES NFR BLD: 0.36 K/UL (ref 0.1–0.95)
MONOCYTES NFR BLD: 8 % (ref 2–12)
NEUTROPHILS NFR BLD: 68 % (ref 43–80)
NEUTS SEG NFR BLD: 3.25 K/UL (ref 1.8–7.3)
NITRITE UR QL STRIP: NEGATIVE
PH UR STRIP: 6 [PH] (ref 5–9)
PLATELET # BLD AUTO: 214 K/UL (ref 130–450)
PMV BLD AUTO: 8.5 FL (ref 7–12)
POTASSIUM SERPL-SCNC: 4.7 MMOL/L (ref 3.5–5)
PROT SERPL-MCNC: 7.3 G/DL (ref 6.4–8.3)
PROT UR STRIP-MCNC: NEGATIVE MG/DL
RBC # BLD AUTO: 4.71 M/UL (ref 3.5–5.5)
RBC #/AREA URNS HPF: ABNORMAL /HPF
SODIUM SERPL-SCNC: 141 MMOL/L (ref 132–146)
SP GR UR STRIP: 1.02 (ref 1–1.03)
T4 SERPL-MCNC: 8.9 UG/DL (ref 4.5–11.7)
TRIGL SERPL-MCNC: 115 MG/DL
TSH SERPL DL<=0.05 MIU/L-ACNC: 1.94 UIU/ML (ref 0.27–4.2)
UROBILINOGEN UR STRIP-ACNC: 0.2 EU/DL (ref 0–1)
VIT B12 SERPL-MCNC: >2000 PG/ML (ref 211–946)
VLDLC SERPL CALC-MCNC: 23 MG/DL
WBC #/AREA URNS HPF: ABNORMAL /HPF
WBC OTHER # BLD: 4.8 K/UL (ref 4.5–11.5)

## 2024-08-08 PROCEDURE — 82306 VITAMIN D 25 HYDROXY: CPT

## 2024-08-08 PROCEDURE — 36415 COLL VENOUS BLD VENIPUNCTURE: CPT

## 2024-08-08 PROCEDURE — 80061 LIPID PANEL: CPT

## 2024-08-08 PROCEDURE — 80053 COMPREHEN METABOLIC PANEL: CPT

## 2024-08-08 PROCEDURE — 83525 ASSAY OF INSULIN: CPT

## 2024-08-08 PROCEDURE — 81001 URINALYSIS AUTO W/SCOPE: CPT

## 2024-08-08 PROCEDURE — 84443 ASSAY THYROID STIM HORMONE: CPT

## 2024-08-08 PROCEDURE — 84436 ASSAY OF TOTAL THYROXINE: CPT

## 2024-08-08 PROCEDURE — 82607 VITAMIN B-12: CPT

## 2024-08-08 PROCEDURE — 85025 COMPLETE CBC W/AUTO DIFF WBC: CPT

## 2024-08-08 PROCEDURE — 83036 HEMOGLOBIN GLYCOSYLATED A1C: CPT

## 2024-08-09 LAB
INSULIN REFERENCE RANGE:: NORMAL
INSULIN: 10 MU/L

## 2024-08-15 ENCOUNTER — OFFICE VISIT (OUTPATIENT)
Dept: PODIATRY | Age: 64
End: 2024-08-15
Payer: COMMERCIAL

## 2024-08-15 VITALS
TEMPERATURE: 98.2 F | WEIGHT: 174 LBS | SYSTOLIC BLOOD PRESSURE: 128 MMHG | DIASTOLIC BLOOD PRESSURE: 80 MMHG | BODY MASS INDEX: 26.46 KG/M2

## 2024-08-15 DIAGNOSIS — M79.675 PAIN IN LEFT TOE(S): ICD-10-CM

## 2024-08-15 DIAGNOSIS — L97.521 SKIN ULCER OF LEFT GREAT TOE, LIMITED TO BREAKDOWN OF SKIN (HCC): Primary | ICD-10-CM

## 2024-08-15 PROCEDURE — 3017F COLORECTAL CA SCREEN DOC REV: CPT | Performed by: PODIATRIST

## 2024-08-15 PROCEDURE — 99213 OFFICE O/P EST LOW 20 MIN: CPT | Performed by: PODIATRIST

## 2024-08-15 PROCEDURE — G8427 DOCREV CUR MEDS BY ELIG CLIN: HCPCS | Performed by: PODIATRIST

## 2024-08-15 PROCEDURE — 1036F TOBACCO NON-USER: CPT | Performed by: PODIATRIST

## 2024-08-15 PROCEDURE — G8417 CALC BMI ABV UP PARAM F/U: HCPCS | Performed by: PODIATRIST

## 2024-08-15 NOTE — PROGRESS NOTES
Rebecca Baermatthew : 1960 Sex: female  Age: 63 y.o.    Patient was referred by Gilbert Rabago DO    Chief Complaint   Patient presents with    Foot Ulcer    Callouses     Left great toe ulcer callous doing much  better today Gilbert Rabago DO  Last visit 24       SUBJECTIVE patient is seen today for follow-up of an ulcer to the left great toe she is doing very good with the healing  HPI  Review of Systems  Const: Denies constitutional symptoms  Musculo: Denies symptoms other than stated above  Skin: Denies symptoms other than stated above       Current Outpatient Medications:     collagenase (SANTYL) 250 UNIT/GM ointment, Apply topically daily., Disp: 30 g, Rfl: 0    cyclobenzaprine (FLEXERIL) 10 MG tablet, Take 1 tablet by mouth 3 times daily as needed for Muscle spasms Tired do not drive, Disp: 270 tablet, Rfl: 5    azelastine (ASTELIN) 0.1 % nasal spray, 1 spray by Nasal route 2 times daily Use in each nostril as directed, Disp: 30 mL, Rfl: 3    Rimegepant Sulfate 75 MG TBDP, One every other day maintenance and extra one in between if headache, Disp: 30 tablet, Rfl: 5    Estradiol-Estriol-Progesterone (BIEST/PROGESTERONE) CREA, Place 0.5 mLs onto the skin daily 0.5 ML every morning, Disp: 60 g, Rfl: 11    Multiple Vitamins-Minerals (THERAPEUTIC MULTIVITAMIN-MINERALS) tablet, Take 1 tablet by mouth daily, Disp: , Rfl:   No Known Allergies    Past Medical History:   Diagnosis Date    Migraine     Osteopenia      Past Surgical History:   Procedure Laterality Date    CHOLECYSTECTOMY, LAPAROSCOPIC      COLONOSCOPY N/A 2023    COLONOSCOPY WITH BIOPSY performed by Haja Deras MD at [facility] ENDOSCOPY    FOOT NEUROMA SURGERY Bilateral     DR PELAEZ    HYSTERECTOMY (CERVIX STATUS UNKNOWN)      DR ELDRIDGE    ROTATOR CUFF REPAIR  ,     RIGHT     Family History   Problem Relation Age of Onset    Other Father          [id number] Aortic aneurysm 10 cm AAA repair    No Known

## 2024-10-15 ENCOUNTER — OFFICE VISIT (OUTPATIENT)
Dept: PODIATRY | Age: 64
End: 2024-10-15
Payer: COMMERCIAL

## 2024-10-15 VITALS
TEMPERATURE: 98.2 F | BODY MASS INDEX: 26.46 KG/M2 | DIASTOLIC BLOOD PRESSURE: 82 MMHG | SYSTOLIC BLOOD PRESSURE: 124 MMHG | WEIGHT: 174 LBS

## 2024-10-15 DIAGNOSIS — S90.32XA CONTUSION OF LEFT FOOT, INITIAL ENCOUNTER: Primary | ICD-10-CM

## 2024-10-15 PROCEDURE — 1036F TOBACCO NON-USER: CPT | Performed by: PODIATRIST

## 2024-10-15 PROCEDURE — G8484 FLU IMMUNIZE NO ADMIN: HCPCS | Performed by: PODIATRIST

## 2024-10-15 PROCEDURE — G8427 DOCREV CUR MEDS BY ELIG CLIN: HCPCS | Performed by: PODIATRIST

## 2024-10-15 PROCEDURE — G8417 CALC BMI ABV UP PARAM F/U: HCPCS | Performed by: PODIATRIST

## 2024-10-15 PROCEDURE — 99214 OFFICE O/P EST MOD 30 MIN: CPT | Performed by: PODIATRIST

## 2024-10-15 PROCEDURE — 3017F COLORECTAL CA SCREEN DOC REV: CPT | Performed by: PODIATRIST

## 2024-10-15 RX ORDER — MELOXICAM 7.5 MG/1
7.5 TABLET ORAL DAILY
Qty: 30 TABLET | Refills: 3 | Status: SHIPPED | OUTPATIENT
Start: 2024-10-15

## 2024-10-16 NOTE — PROGRESS NOTES
10/16/24  Rebecca Dietz : 1960 Sex: female  Age: 63 y.o.    Patient was referred by Gilbert Rabago DO    Chief Complaint   Patient presents with    Foot Pain    Toe Pain     Gilbert Rabago DO  2024    Callouses     F/u skin ulcer left first doing great  Pt was in [place] she fell left foot up her leg        SUBJECTIVE patient is seen today for follow-up of the ulcer on her left great toe this is asymptomatic while on her vacation in [place] she did tripped and hit her shin ankle and foot.  Foot Pain     Toe Pain       Review of Systems  Const: Denies constitutional symptoms  Musculo: Denies symptoms other than stated above  Skin: Denies symptoms other than stated above       Current Outpatient Medications:     meloxicam (MOBIC) 7.5 MG tablet, Take 1 tablet by mouth daily, Disp: 30 tablet, Rfl: 3    cyclobenzaprine (FLEXERIL) 10 MG tablet, Take 1 tablet by mouth 3 times daily as needed for Muscle spasms Tired do not drive, Disp: 270 tablet, Rfl: 5    azelastine (ASTELIN) 0.1 % nasal spray, 1 spray by Nasal route 2 times daily Use in each nostril as directed, Disp: 30 mL, Rfl: 3    Rimegepant Sulfate 75 MG TBDP, One every other day maintenance and extra one in between if headache, Disp: 30 tablet, Rfl: 5    Estradiol-Estriol-Progesterone (BIEST/PROGESTERONE) CREA, Place 0.5 mLs onto the skin daily 0.5 ML every morning, Disp: 60 g, Rfl: 11    Multiple Vitamins-Minerals (THERAPEUTIC MULTIVITAMIN-MINERALS) tablet, Take 1 tablet by mouth daily, Disp: , Rfl:   No Known Allergies    Past Medical History:   Diagnosis Date    Migraine     Osteopenia      Past Surgical History:   Procedure Laterality Date    CHOLECYSTECTOMY, LAPAROSCOPIC      COLONOSCOPY N/A 2023    COLONOSCOPY WITH BIOPSY performed by Haja Deras MD at [facility] ENDOSCOPY    FOOT NEUROMA SURGERY Bilateral     DR PELAEZ    HYSTERECTOMY (CERVIX STATUS UNKNOWN)      DR ELDRIDGE    ROTATOR CUFF REPAIR  2004, 2005    RIGHT

## 2024-11-08 LAB — MAMMOGRAPHY, EXTERNAL: NORMAL

## 2024-11-14 DIAGNOSIS — J01.90 ACUTE BACTERIAL SINUSITIS: ICD-10-CM

## 2024-11-14 DIAGNOSIS — B96.89 ACUTE BACTERIAL SINUSITIS: ICD-10-CM

## 2024-11-17 RX ORDER — AZELASTINE 1 MG/ML
1 SPRAY, METERED NASAL 2 TIMES DAILY
Qty: 30 ML | Refills: 3 | Status: SHIPPED | OUTPATIENT
Start: 2024-11-17

## 2025-05-01 DIAGNOSIS — B96.89 ACUTE BACTERIAL SINUSITIS: ICD-10-CM

## 2025-05-01 DIAGNOSIS — J01.90 ACUTE BACTERIAL SINUSITIS: ICD-10-CM

## 2025-05-02 RX ORDER — AZELASTINE 1 MG/ML
1 SPRAY, METERED NASAL 2 TIMES DAILY
Qty: 30 ML | Refills: 3 | Status: SHIPPED | OUTPATIENT
Start: 2025-05-02

## 2025-08-01 DIAGNOSIS — M54.2 CERVICALGIA: ICD-10-CM

## 2025-08-01 RX ORDER — CYCLOBENZAPRINE HCL 10 MG
10 TABLET ORAL 3 TIMES DAILY PRN
Qty: 270 TABLET | Refills: 5 | Status: SHIPPED | OUTPATIENT
Start: 2025-08-01

## 2025-08-01 NOTE — TELEPHONE ENCOUNTER
Name of Medication(s) Requested:  Requested Prescriptions     Pending Prescriptions Disp Refills    cyclobenzaprine (FLEXERIL) 10 MG tablet 270 tablet 5     Sig: Take 1 tablet by mouth 3 times daily as needed for Muscle spasms Tired do not drive       Medication is on current medication list Yes    Dosage and directions were verified? Yes    Quantity verified: 90 day supply     Pharmacy Verified?  Yes    Last Appointment:  7/31/2024    Future appts:  No future appointments.     (If no appt send self scheduling link. .[email or web address])  Scheduling request sent?     [] Yes  [x] No    Does patient need updated?  [] Yes  [x] No

## (undated) DEVICE — Z INACTIVE USE 2855131 SPONGE GZ W4XL4IN RAYON POLY FILL CVR W/ NONWOVEN FAB

## (undated) DEVICE — GRADUATE TRIANG MEASURE 1000ML BLK PRNT